# Patient Record
Sex: FEMALE | Race: AMERICAN INDIAN OR ALASKA NATIVE | Employment: UNEMPLOYED | URBAN - METROPOLITAN AREA
[De-identification: names, ages, dates, MRNs, and addresses within clinical notes are randomized per-mention and may not be internally consistent; named-entity substitution may affect disease eponyms.]

---

## 2017-03-02 ENCOUNTER — APPOINTMENT (OUTPATIENT)
Dept: CT IMAGING | Facility: CLINIC | Age: 47
DRG: 918 | End: 2017-03-02
Attending: FAMILY MEDICINE

## 2017-03-02 ENCOUNTER — APPOINTMENT (OUTPATIENT)
Dept: GENERAL RADIOLOGY | Facility: CLINIC | Age: 47
DRG: 918 | End: 2017-03-02
Attending: FAMILY MEDICINE

## 2017-03-02 ENCOUNTER — HOSPITAL ENCOUNTER (INPATIENT)
Facility: CLINIC | Age: 47
LOS: 2 days | Discharge: HOME | DRG: 918 | End: 2017-03-04
Attending: FAMILY MEDICINE | Admitting: FAMILY MEDICINE

## 2017-03-02 DIAGNOSIS — N30.00 ACUTE CYSTITIS WITHOUT HEMATURIA: Primary | ICD-10-CM

## 2017-03-02 DIAGNOSIS — F15.10 METHAMPHETAMINE ABUSE (H): ICD-10-CM

## 2017-03-02 DIAGNOSIS — T40.604A OPIATE OVERDOSE, UNDETERMINED INTENT, INITIAL ENCOUNTER (H): ICD-10-CM

## 2017-03-02 DIAGNOSIS — R40.2432 GLASGOW COMA SCALE TOTAL SCORE 3-8, AT ARRIVAL TO EMERGENCY DEPARTMENT (H): ICD-10-CM

## 2017-03-02 PROBLEM — T50.901A ACCIDENTAL DRUG OVERDOSE: Status: ACTIVE | Noted: 2017-03-02

## 2017-03-02 PROBLEM — R41.89 UNRESPONSIVE: Status: ACTIVE | Noted: 2017-03-02

## 2017-03-02 PROBLEM — T40.601A NARCOTIC OVERDOSE (H): Status: ACTIVE | Noted: 2017-03-02

## 2017-03-02 LAB
ALBUMIN SERPL-MCNC: 3.6 G/DL (ref 3.4–5)
ALBUMIN UR-MCNC: 30 MG/DL
ALP SERPL-CCNC: 110 U/L (ref 40–150)
ALT SERPL W P-5'-P-CCNC: 18 U/L (ref 0–50)
AMPHETAMINES UR QL: ABNORMAL NG/ML
ANION GAP SERPL CALCULATED.3IONS-SCNC: 7 MMOL/L (ref 3–14)
APAP SERPL-MCNC: NORMAL MG/L (ref 10–20)
APPEARANCE UR: ABNORMAL
APTT PPP: 24 SEC (ref 22–37)
AST SERPL W P-5'-P-CCNC: 10 U/L (ref 0–45)
BACTERIA #/AREA URNS HPF: ABNORMAL /HPF
BARBITURATES UR QL SCN: ABNORMAL NG/ML
BASE DEFICIT BLDV-SCNC: 1 MMOL/L
BASE DEFICIT BLDV-SCNC: 1.6 MMOL/L
BASE DEFICIT BLDV-SCNC: 2.2 MMOL/L
BASE DEFICIT BLDV-SCNC: 5.6 MMOL/L
BASOPHILS # BLD AUTO: 0.1 10E9/L (ref 0–0.2)
BASOPHILS NFR BLD AUTO: 0.3 %
BENZODIAZ UR QL SCN: ABNORMAL NG/ML
BETA HCG QUAL IFA URINE: NEGATIVE
BILIRUB SERPL-MCNC: 0.8 MG/DL (ref 0.2–1.3)
BILIRUB UR QL STRIP: NEGATIVE
BUN SERPL-MCNC: 17 MG/DL (ref 7–30)
BUPRENORPHINE UR QL: ABNORMAL NG/ML
CALCIUM SERPL-MCNC: 8.3 MG/DL (ref 8.5–10.1)
CANNABINOIDS UR QL: ABNORMAL NG/ML
CHLORIDE SERPL-SCNC: 108 MMOL/L (ref 94–109)
CO2 SERPL-SCNC: 27 MMOL/L (ref 20–32)
COCAINE UR QL SCN: ABNORMAL NG/ML
COHGB MFR BLD: 3.2 % (ref 0–2)
COLOR UR AUTO: YELLOW
CREAT SERPL-MCNC: 1.2 MG/DL (ref 0.52–1.04)
D-METHAMPHET UR QL: ABNORMAL NG/ML
DIFFERENTIAL METHOD BLD: ABNORMAL
EOSINOPHIL # BLD AUTO: 0.3 10E9/L (ref 0–0.7)
EOSINOPHIL NFR BLD AUTO: 1.7 %
ERYTHROCYTE [DISTWIDTH] IN BLOOD BY AUTOMATED COUNT: 14.4 % (ref 10–15)
ETHANOL SERPL-MCNC: <0.01 G/DL
GFR SERPL CREATININE-BSD FRML MDRD: 48 ML/MIN/1.7M2
GLUCOSE BLDC GLUCOMTR-MCNC: 110 MG/DL (ref 70–99)
GLUCOSE SERPL-MCNC: 243 MG/DL (ref 70–99)
GLUCOSE UR STRIP-MCNC: NEGATIVE MG/DL
GRAN CASTS #/AREA URNS LPF: ABNORMAL /LPF
HCO3 BLDV-SCNC: 24 MMOL/L (ref 21–28)
HCO3 BLDV-SCNC: 25 MMOL/L (ref 21–28)
HCO3 BLDV-SCNC: 26 MMOL/L (ref 21–28)
HCO3 BLDV-SCNC: 26 MMOL/L (ref 21–28)
HCT VFR BLD AUTO: 42 % (ref 35–47)
HGB BLD-MCNC: 13.9 G/DL (ref 11.7–15.7)
HGB UR QL STRIP: ABNORMAL
IMM GRANULOCYTES # BLD: 0 10E9/L (ref 0–0.4)
IMM GRANULOCYTES NFR BLD: 0.2 %
KETONES UR STRIP-MCNC: NEGATIVE MG/DL
LACTATE BLD-SCNC: 0.8 MMOL/L (ref 0.7–2.1)
LACTATE BLD-SCNC: 1.4 MMOL/L (ref 0.7–2.1)
LEUKOCYTE ESTERASE UR QL STRIP: ABNORMAL
LYMPHOCYTES # BLD AUTO: 8.4 10E9/L (ref 0.8–5.3)
LYMPHOCYTES NFR BLD AUTO: 52.2 %
MCH RBC QN AUTO: 28.6 PG (ref 26.5–33)
MCHC RBC AUTO-ENTMCNC: 33.1 G/DL (ref 31.5–36.5)
MCV RBC AUTO: 86 FL (ref 78–100)
METHADONE UR QL SCN: ABNORMAL NG/ML
MONOCYTES # BLD AUTO: 1 10E9/L (ref 0–1.3)
MONOCYTES NFR BLD AUTO: 6.1 %
NEUTROPHILS # BLD AUTO: 6.4 10E9/L (ref 1.6–8.3)
NEUTROPHILS NFR BLD AUTO: 39.5 %
NITRATE UR QL: POSITIVE
NON-SQ EPI CELLS #/AREA URNS LPF: ABNORMAL /LPF
O2/TOTAL GAS SETTING VFR VENT: 21 %
O2/TOTAL GAS SETTING VFR VENT: 40 %
O2/TOTAL GAS SETTING VFR VENT: 40 %
O2/TOTAL GAS SETTING VFR VENT: ABNORMAL %
OPIATES UR QL SCN: ABNORMAL NG/ML
OXYCODONE UR QL SCN: ABNORMAL NG/ML
OXYHGB MFR BLDV: 36 %
PCO2 BLDV: 54 MM HG (ref 40–50)
PCO2 BLDV: 56 MM HG (ref 40–50)
PCO2 BLDV: 59 MM HG (ref 40–50)
PCO2 BLDV: 74 MM HG (ref 40–50)
PCP UR QL SCN: ABNORMAL NG/ML
PH BLDV: 7.12 PH (ref 7.32–7.43)
PH BLDV: 7.24 PH (ref 7.32–7.43)
PH BLDV: 7.27 PH (ref 7.32–7.43)
PH BLDV: 7.29 PH (ref 7.32–7.43)
PH UR STRIP: 6 PH (ref 5–7)
PLATELET # BLD AUTO: 381 10E9/L (ref 150–450)
PO2 BLDV: 25 MM HG (ref 25–47)
PO2 BLDV: 30 MM HG (ref 25–47)
PO2 BLDV: 31 MM HG (ref 25–47)
PO2 BLDV: 69 MM HG (ref 25–47)
POTASSIUM SERPL-SCNC: 3.6 MMOL/L (ref 3.4–5.3)
PROPOXYPH UR QL: ABNORMAL NG/ML
PROT SERPL-MCNC: 8 G/DL (ref 6.8–8.8)
RBC # BLD AUTO: 4.86 10E12/L (ref 3.8–5.2)
RBC #/AREA URNS AUTO: ABNORMAL /HPF (ref 0–2)
SALICYLATES SERPL-MCNC: NORMAL MG/DL
SODIUM SERPL-SCNC: 142 MMOL/L (ref 133–144)
SP GR UR STRIP: >1.03 (ref 1–1.03)
TRANS CELLS #/AREA URNS HPF: ABNORMAL /HPF
TRICYCLICS UR QL SCN: ABNORMAL NG/ML
URN SPEC COLLECT METH UR: ABNORMAL
UROBILINOGEN UR STRIP-ACNC: 0.2 EU/DL (ref 0.2–1)
WBC # BLD AUTO: 16.2 10E9/L (ref 4–11)
WBC #/AREA URNS AUTO: ABNORMAL /HPF (ref 0–2)

## 2017-03-02 PROCEDURE — 70450 CT HEAD/BRAIN W/O DYE: CPT

## 2017-03-02 PROCEDURE — 80329 ANALGESICS NON-OPIOID 1 OR 2: CPT | Performed by: FAMILY MEDICINE

## 2017-03-02 PROCEDURE — 40000940 XR CHEST PORT 1 VW

## 2017-03-02 PROCEDURE — 85730 THROMBOPLASTIN TIME PARTIAL: CPT | Performed by: FAMILY MEDICINE

## 2017-03-02 PROCEDURE — 20000003 ZZH R&B ICU

## 2017-03-02 PROCEDURE — 51702 INSERT TEMP BLADDER CATH: CPT

## 2017-03-02 PROCEDURE — 82805 BLOOD GASES W/O2 SATURATION: CPT | Performed by: FAMILY MEDICINE

## 2017-03-02 PROCEDURE — 87081 CULTURE SCREEN ONLY: CPT | Performed by: FAMILY MEDICINE

## 2017-03-02 PROCEDURE — 00000146 ZZHCL STATISTIC GLUCOSE BY METER IP

## 2017-03-02 PROCEDURE — 83605 ASSAY OF LACTIC ACID: CPT | Performed by: FAMILY MEDICINE

## 2017-03-02 PROCEDURE — 80320 DRUG SCREEN QUANTALCOHOLS: CPT | Performed by: FAMILY MEDICINE

## 2017-03-02 PROCEDURE — 25000128 H RX IP 250 OP 636: Performed by: FAMILY MEDICINE

## 2017-03-02 PROCEDURE — 87088 URINE BACTERIA CULTURE: CPT | Performed by: FAMILY MEDICINE

## 2017-03-02 PROCEDURE — 99291 CRITICAL CARE FIRST HOUR: CPT | Mod: 25

## 2017-03-02 PROCEDURE — 93005 ELECTROCARDIOGRAM TRACING: CPT

## 2017-03-02 PROCEDURE — 99291 CRITICAL CARE FIRST HOUR: CPT | Mod: 25 | Performed by: FAMILY MEDICINE

## 2017-03-02 PROCEDURE — 96375 TX/PRO/DX INJ NEW DRUG ADDON: CPT

## 2017-03-02 PROCEDURE — 36415 COLL VENOUS BLD VENIPUNCTURE: CPT | Performed by: FAMILY MEDICINE

## 2017-03-02 PROCEDURE — 94002 VENT MGMT INPAT INIT DAY: CPT

## 2017-03-02 PROCEDURE — 80053 COMPREHEN METABOLIC PANEL: CPT | Performed by: FAMILY MEDICINE

## 2017-03-02 PROCEDURE — 25000125 ZZHC RX 250: Performed by: FAMILY MEDICINE

## 2017-03-02 PROCEDURE — 99223 1ST HOSP IP/OBS HIGH 75: CPT | Mod: AI | Performed by: FAMILY MEDICINE

## 2017-03-02 PROCEDURE — 31500 INSERT EMERGENCY AIRWAY: CPT

## 2017-03-02 PROCEDURE — 99292 CRITICAL CARE ADDL 30 MIN: CPT

## 2017-03-02 PROCEDURE — 72125 CT NECK SPINE W/O DYE: CPT

## 2017-03-02 PROCEDURE — 82375 ASSAY CARBOXYHB QUANT: CPT | Performed by: FAMILY MEDICINE

## 2017-03-02 PROCEDURE — 40000274 ZZH STATISTIC RCP CONSULT EA 30 MIN

## 2017-03-02 PROCEDURE — 96365 THER/PROPH/DIAG IV INF INIT: CPT

## 2017-03-02 PROCEDURE — 82803 BLOOD GASES ANY COMBINATION: CPT | Performed by: FAMILY MEDICINE

## 2017-03-02 PROCEDURE — 87186 SC STD MICRODIL/AGAR DIL: CPT | Performed by: FAMILY MEDICINE

## 2017-03-02 PROCEDURE — 5A1935Z RESPIRATORY VENTILATION, LESS THAN 24 CONSECUTIVE HOURS: ICD-10-PCS | Performed by: FAMILY MEDICINE

## 2017-03-02 PROCEDURE — 40000270 ZZH STATISTIC OXYGEN  O2DAILY TECH TIME

## 2017-03-02 PROCEDURE — 93010 ELECTROCARDIOGRAM REPORT: CPT | Performed by: FAMILY MEDICINE

## 2017-03-02 PROCEDURE — 31500 INSERT EMERGENCY AIRWAY: CPT | Performed by: FAMILY MEDICINE

## 2017-03-02 PROCEDURE — 96368 THER/DIAG CONCURRENT INF: CPT

## 2017-03-02 PROCEDURE — 99207 ZZC CDG-MDM COMPONENT: MEETS HIGH - UP CODED: CPT | Performed by: FAMILY MEDICINE

## 2017-03-02 PROCEDURE — 85025 COMPLETE CBC W/AUTO DIFF WBC: CPT | Performed by: FAMILY MEDICINE

## 2017-03-02 PROCEDURE — 87086 URINE CULTURE/COLONY COUNT: CPT | Performed by: FAMILY MEDICINE

## 2017-03-02 PROCEDURE — 40000275 ZZH STATISTIC RCP TIME EA 10 MIN

## 2017-03-02 PROCEDURE — 96367 TX/PROPH/DG ADDL SEQ IV INF: CPT

## 2017-03-02 RX ORDER — PROPOFOL 10 MG/ML
100 INJECTION, EMULSION INTRAVENOUS CONTINUOUS
Status: DISCONTINUED | OUTPATIENT
Start: 2017-03-02 | End: 2017-03-02

## 2017-03-02 RX ORDER — DEXTROSE MONOHYDRATE 25 G/50ML
25-50 INJECTION, SOLUTION INTRAVENOUS
Status: DISCONTINUED | OUTPATIENT
Start: 2017-03-02 | End: 2017-03-04 | Stop reason: HOSPADM

## 2017-03-02 RX ORDER — ZOLPIDEM TARTRATE 5 MG/1
TABLET ORAL
COMMUNITY

## 2017-03-02 RX ORDER — NALOXONE HYDROCHLORIDE 0.4 MG/ML
.1-.4 INJECTION, SOLUTION INTRAMUSCULAR; INTRAVENOUS; SUBCUTANEOUS
Status: DISCONTINUED | OUTPATIENT
Start: 2017-03-02 | End: 2017-03-02

## 2017-03-02 RX ORDER — VECURONIUM BROMIDE 1 MG/ML
10 INJECTION, POWDER, LYOPHILIZED, FOR SOLUTION INTRAVENOUS ONCE
Status: DISCONTINUED | OUTPATIENT
Start: 2017-03-02 | End: 2017-03-02

## 2017-03-02 RX ORDER — NICOTINE POLACRILEX 4 MG
15-30 LOZENGE BUCCAL
Status: DISCONTINUED | OUTPATIENT
Start: 2017-03-02 | End: 2017-03-04 | Stop reason: HOSPADM

## 2017-03-02 RX ORDER — VECURONIUM BROMIDE 1 MG/ML
100 INJECTION, POWDER, LYOPHILIZED, FOR SOLUTION INTRAVENOUS ONCE
Status: DISCONTINUED | OUTPATIENT
Start: 2017-03-02 | End: 2017-03-02

## 2017-03-02 RX ORDER — GABAPENTIN 600 MG/1
1200 TABLET ORAL
COMMUNITY

## 2017-03-02 RX ORDER — NALOXONE HYDROCHLORIDE 0.4 MG/ML
0.4 INJECTION, SOLUTION INTRAMUSCULAR; INTRAVENOUS; SUBCUTANEOUS
Status: DISCONTINUED | OUTPATIENT
Start: 2017-03-02 | End: 2017-03-02

## 2017-03-02 RX ORDER — PROPOFOL 10 MG/ML
5-75 INJECTION, EMULSION INTRAVENOUS CONTINUOUS
Status: DISCONTINUED | OUTPATIENT
Start: 2017-03-02 | End: 2017-03-03

## 2017-03-02 RX ORDER — BUPROPION HYDROCHLORIDE 300 MG/1
300 TABLET ORAL
COMMUNITY
Start: 2015-11-03

## 2017-03-02 RX ORDER — LIDOCAINE 40 MG/G
CREAM TOPICAL
Status: DISCONTINUED | OUTPATIENT
Start: 2017-03-02 | End: 2017-03-04 | Stop reason: HOSPADM

## 2017-03-02 RX ORDER — FENTANYL CITRATE 50 UG/ML
50-100 INJECTION, SOLUTION INTRAMUSCULAR; INTRAVENOUS
Status: DISCONTINUED | OUTPATIENT
Start: 2017-03-02 | End: 2017-03-04 | Stop reason: HOSPADM

## 2017-03-02 RX ORDER — DOPAMINE HYDROCHLORIDE 160 MG/100ML
5-20 INJECTION, SOLUTION INTRAVENOUS CONTINUOUS
Status: DISCONTINUED | OUTPATIENT
Start: 2017-03-02 | End: 2017-03-02

## 2017-03-02 RX ORDER — NALOXONE HYDROCHLORIDE 0.4 MG/ML
.1-.4 INJECTION, SOLUTION INTRAMUSCULAR; INTRAVENOUS; SUBCUTANEOUS
Status: DISCONTINUED | OUTPATIENT
Start: 2017-03-02 | End: 2017-03-04 | Stop reason: HOSPADM

## 2017-03-02 RX ORDER — CIPROFLOXACIN 2 MG/ML
200 INJECTION, SOLUTION INTRAVENOUS EVERY 12 HOURS
Status: DISCONTINUED | OUTPATIENT
Start: 2017-03-02 | End: 2017-03-04

## 2017-03-02 RX ORDER — SODIUM CHLORIDE 9 MG/ML
1000 INJECTION, SOLUTION INTRAVENOUS CONTINUOUS
Status: DISCONTINUED | OUTPATIENT
Start: 2017-03-02 | End: 2017-03-03 | Stop reason: DRUGHIGH

## 2017-03-02 RX ORDER — OXYCODONE AND ACETAMINOPHEN 5; 325 MG/1; MG/1
1-2 TABLET ORAL
COMMUNITY
Start: 2015-11-27

## 2017-03-02 RX ORDER — LISINOPRIL 20 MG/1
20 TABLET ORAL
COMMUNITY

## 2017-03-02 RX ADMIN — SODIUM CHLORIDE 1000 ML: 9 INJECTION, SOLUTION INTRAVENOUS at 15:57

## 2017-03-02 RX ADMIN — Medication 100 MG: at 15:56

## 2017-03-02 RX ADMIN — CIPROFLOXACIN 200 MG: 2 INJECTION INTRAVENOUS at 17:36

## 2017-03-02 RX ADMIN — PROPOFOL 5 MCG/KG/MIN: 10 INJECTION, EMULSION INTRAVENOUS at 16:05

## 2017-03-02 RX ADMIN — SODIUM CHLORIDE 1000 ML: 9 INJECTION, SOLUTION INTRAVENOUS at 16:24

## 2017-03-02 RX ADMIN — ENOXAPARIN SODIUM 40 MG: 40 INJECTION SUBCUTANEOUS at 20:04

## 2017-03-02 RX ADMIN — SODIUM CHLORIDE 1000 ML: 9 INJECTION, SOLUTION INTRAVENOUS at 16:02

## 2017-03-02 RX ADMIN — PROPOFOL 100 MG: 10 INJECTION, EMULSION INTRAVENOUS at 15:57

## 2017-03-02 RX ADMIN — Medication 5 MG: at 19:45

## 2017-03-02 RX ADMIN — PROPOFOL 30 MCG/KG/MIN: 10 INJECTION, EMULSION INTRAVENOUS at 20:18

## 2017-03-02 NOTE — IP AVS SNAPSHOT
[facility] ICU    [address]     [place] [address]    Phone:  [phone]                                       After Visit Summary   3/2/2017    Ankita Bateman    MRN: [id number]           After Visit Summary Signature Page     I have received my discharge instructions, and my questions have been answered. I have discussed any challenges I see with this plan with the nurse or doctor.    ..........................................................................................................................................  Patient/Patient Representative Signature      ..........................................................................................................................................  Patient Representative Print Name and Relationship to Patient    ..................................................               ................................................  Date                                            Time    ..........................................................................................................................................  Reviewed by Signature/Title    ...................................................              ..............................................  Date                                                            Time

## 2017-03-02 NOTE — ED PROVIDER NOTES
History   No chief complaint on file.    History provided by: niece. The history is limited by the condition of the patient.     Ankita Bateman is a 46 year old female who presents to the ED via private car.  The patient is completely nonresponsive in the backseat.     I was able to talk to the other 2 people that were in the car.  There were very evasive with their answers about the possibility of heroin overdose.  They told me that Ankita was riding in the car with them on the way to [place].  They stopped for gas in [place] and Ankita was alert and talkative.  Sometime after that Ankita started gurgling, was not able to breathe and then became unresponsive.  They decided to bring her immediately to the emergency department and were relatively close to our facility.  One of them thought that she was unconscious from [place] of [place], so probably 10 - 12 miles of highway travel.       There is no problem list on file for this patient.    No past medical history on file.    History reviewed. No pertinent past surgical history.    No family history on file.    Social History   Substance Use Topics     Smoking status: Not on file     Smokeless tobacco: Not on file     Alcohol use Not on file          There is no immunization history on file for this patient.     No Known Allergies    No current outpatient prescriptions on file.         I have reviewed the Medications, Allergies, Past Medical and Surgical History, and Social History in the Epic system.    Review of Systems   Unable to perform ROS: Patient unresponsive       Physical Exam      See medical chart for vital signs during her time in the emergency department.    Physical Exam  Gen.: 46-year-old male unresponsive.  GCS 3.  She does have a pulse.  She has no respiratory drive.  HEENT: Atraumatic.  Pupils 1-2 mm and nonresponsive.  No injury to external ears.  No injury to dentition.  No vomit obvious.  Nares patent.  Heart: Regular rate and rhythm, no  murmurs  Lungs: No air movement heard bilaterally on arrival.  Abdomen: Soft, bowel sounds present  Extremities: No obvious injury or deformity to extremities.  Skin: Skin is cool to the touch.  Patient has multiple scars and tattoos  : At the time of Torres catheter placement external genitalia appears normal      ED Course  Ankita arrived in the back of a four-door car.  She was slumped over in the back seat unconscious.  She did have a pulse at the time we opened the back door.  The patient had been incontinent of urine.  I did not see evidence of vomit.  A Code Blue was called.  Arvind and I lifted her onto the gurney, trying to be careful to hold her head with some support.  She was completely unresponsive as we rolled into room 5.  We started bag valve mask for oxygenation and hooked up some monitoring.  Upon arrival she had a BP at 157/94, heart rate of 101 and SpO2 at 98%. Her oxygen saturations originally were in the low 80%.  We were able to get IV access and start IV fluids.  We placed a nasal airway and were able to improve oxygen saturations above 90%.  The patient was still unresponsive.  I thought we would be able to do an intubation without medications.   I did use the glide scope to look at her airway and was able to see the cords, but they were clamped shut.  I actually had the ET tube at the cords ready to pass, but they were not opening.  By that time pharmacy had rocuronium and propofol drawn up and we gave her 100 mg of propofol followed by 100 mg of rocuronium.  We had been able to use BVM for oxygen saturations 85-95% during this time.  Obviously the cords were not clamped shut this whole time, but I did not have a view of the cords the entire time.  About 45 seconds after the rocuronium was pushed I was able to visualize the cords and easily pass a 7.5 ET tube.  We secured at 23 cm at the teeth.  Goran Booker CRNA was present as well and passed an OG tube at this time.    Tube was fastened.   End-tidal CO2 was ranging 36-43, we had good chest rise bilaterally and good breath sounds bilaterally.  Torres catheter was placed and there was about 8 cc of urine in her bladder.  Labs have been drawn and are pending.  Urine is being sent to lab at the time of this note.  We have fentanyl available for pain.  The patient has been given one dose of Narcan with no change in her vital signs.  I do have some dopamine ordered if needed for hypotension.  Propofol drip is being used for sedation.  We are going to get a CT scan of her head and neck.  With the ventilator set up with a respiratory rate of 16 as I think the patient is likely to be acidic.     5:16 PM  Chest x-ray for tube placement shows ET tube and OG tube to be in good position.  CT scan of the head to my reading shows no acute bleed and no fracture, and no fluid in the sinuses.  CT scan of the cervical spine also shows no fracture, no pneumothorax and the upper lung fields and no soft tissue swelling.  Formal reports agree with these findings.  The patient could be hospitalized here at [facility] for opiate overdose and methamphetamine abuse.         ED Course     Procedures             EKG Interpretation:      Interpreted by Alexander Mccauley  Time reviewed:  1620  Symptoms at time of EKG: unconscious, likely overdose   Rhythm: sinus tachycardia  Rate: 101  Axis: normal  Ectopy: none  Conduction: normal  ST Segments/ T Waves: No ST-T wave changes  Q Waves: none  Comparison to prior: No old EKG available    Clinical Impression: sinus tachycardia        Critical Care time:  was 45 minutes for this patient excluding procedures.      Results for orders placed or performed during the hospital encounter of 03/02/17 (from the past 24 hour(s))   CBC with platelets differential   Result Value Ref Range    WBC 16.2 (H) 4.0 - 11.0 10e9/L    RBC Count 4.86 3.8 - 5.2 10e12/L    Hemoglobin 13.9 11.7 - 15.7 g/dL    Hematocrit 42.0 35.0 - 47.0 %    MCV 86 78  - 100 fl    MCH 28.6 26.5 - 33.0 pg    MCHC 33.1 31.5 - 36.5 g/dL    RDW 14.4 10.0 - 15.0 %    Platelet Count 381 150 - 450 10e9/L    Diff Method Automated Method     % Neutrophils 39.5 %    % Lymphocytes 52.2 %    % Monocytes 6.1 %    % Eosinophils 1.7 %    % Basophils 0.3 %    % Immature Granulocytes 0.2 %    Absolute Neutrophil 6.4 1.6 - 8.3 10e9/L    Absolute Lymphocytes 8.4 (H) 0.8 - 5.3 10e9/L    Absolute Monocytes 1.0 0.0 - 1.3 10e9/L    Absolute Eosinophils 0.3 0.0 - 0.7 10e9/L    Absolute Basophils 0.1 0.0 - 0.2 10e9/L    Abs Immature Granulocytes 0.0 0 - 0.4 10e9/L   Partial thromboplastin time   Result Value Ref Range    PTT 24 22 - 37 sec   Comprehensive metabolic panel   Result Value Ref Range    Sodium 142 133 - 144 mmol/L    Potassium 3.6 3.4 - 5.3 mmol/L    Chloride 108 94 - 109 mmol/L    Carbon Dioxide 27 20 - 32 mmol/L    Anion Gap 7 3 - 14 mmol/L    Glucose 243 (H) 70 - 99 mg/dL    Urea Nitrogen 17 7 - 30 mg/dL    Creatinine 1.20 (H) 0.52 - 1.04 mg/dL    GFR Estimate 48 (L) >60 mL/min/1.7m2    GFR Estimate If Black 59 (L) >60 mL/min/1.7m2    Calcium 8.3 (L) 8.5 - 10.1 mg/dL    Bilirubin Total 0.8 0.2 - 1.3 mg/dL    Albumin 3.6 3.4 - 5.0 g/dL    Protein Total 8.0 6.8 - 8.8 g/dL    Alkaline Phosphatase 110 40 - 150 U/L    ALT 18 0 - 50 U/L    AST 10 0 - 45 U/L   Salicylate level   Result Value Ref Range    Salicylate Level  mg/dL     <2  Therapeutic:        <20   Anti inflammatory:  15-30     Ethanol level   Result Value Ref Range    Ethanol g/dL <0.01 <0.01 g/dL   Carbon monoxide   Result Value Ref Range    Carbon Monoxide 3.2 (H) 0 - 2 %   Acetaminophen level   Result Value Ref Range    Acetaminophen Level <2  Therapeutic range: 10-20 mg/L   mg/L   Blood gas venous   Result Value Ref Range    Ph Venous 7.12 (LL) 7.32 - 7.43 pH    PCO2 Venous 74 (H) 40 - 50 mm Hg    PO2 Venous 69 (H) 25 - 47 mm Hg    Bicarbonate Venous 24 21 - 28 mmol/L    Base Deficit Venous 5.6 mmol/L    FIO2 21    Lactic acid  whole blood   Result Value Ref Range    Lactic Acid 1.4 0.7 - 2.1 mmol/L   XR Chest Port 1 View    Narrative    CHEST PORTABLE ONE VIEW   3/2/2017 3:51 PM     HISTORY: Evaluate endotracheal tube and orogastric tube placement.    COMPARISON: None.      Impression    IMPRESSION: Heart size and pulmonary vascularity are normal. There are  bibasilar interstitial changes which have the appearance of [place]  B-lines suggesting interstitial edema. The upper lungs appear clear.  Endotracheal tube in good position approximately 2.5 cm above the  lani. Orogastric tube at the esophagogastric junction.     MEERA COHEN MD   UA with Microscopic   Result Value Ref Range    Color Urine Yellow     Appearance Urine Cloudy     Glucose Urine Negative NEG mg/dL    Bilirubin Urine Negative NEG    Ketones Urine Negative NEG mg/dL    Specific Gravity Urine >1.030 1.003 - 1.035    pH Urine 6.0 5.0 - 7.0 pH    Protein Albumin Urine 30 (A) NEG mg/dL    Urobilinogen Urine 0.2 0.2 - 1.0 EU/dL    Nitrite Urine Positive (A) NEG    Blood Urine Trace (A) NEG    Leukocyte Esterase Urine Trace (A) NEG    Source Unspecified Urine     WBC Urine 25-50 (A) 0 - 2 /HPF    RBC Urine O - 2 0 - 2 /HPF    Granular Casts 0-2 (A) NEG /LPF    Squamous Epithelial /LPF Urine Moderate (A) FEW /LPF    Transitional Epi Few FEW /HPF    Bacteria Urine Many (A) NEG /HPF   Beta HCG qual IFA urine   Result Value Ref Range    Beta HCG Qual IFA Urine Negative NEG   Urine Drugs of Abuse Screen Panel 13   Result Value Ref Range    Cannabinoids (49-ozh-5-carboxy-9-THC)  NDET ng/mL     Not Detected   Cutoff for a negative cannabinoid is 50 ng/mL or less.      Phencyclidine (Phencyclidine)  NDET ng/mL     Not Detected   Cutoff for a negative PCP is 25 ng/mL or less.      Cocaine (Benzoylecgonine)  NDET ng/mL     Not Detected   Cutoff for a negative cocaine is 150 ng/ml or less.      Methamphetamine (d-Methamphetamine) (A) NDET ng/mL     Detected, Abnormal Result   Cutoff for  a positive methamphetamine is greater than 500 ng/ml.   This is an unconfirmed screening result to be used for medical purposes only.   Order [id number] for confirmation or individual confirmation tests to [facility].      Opiates (Morphine) (A) NDET ng/mL     Detected, Abnormal Result   Cutoff for a positive opiate is greater than 100 ng/ml.   This is an unconfirmed screening result to be used for medical purposes only.   Order [id number] for confirmation or individual confirmation tests to [facility].      Amphetamine (d-Amphetamine) (A) NDET ng/mL     Detected, Abnormal Result   Cutoff for a positive amphetamine is greater than 500 ng/ml.   This is an unconfirmed screening result to be used for medical purposes only.   Order [id number] for confirmation or individual confirmation tests to [facility].      Benzodiazepines (Nordiazepam)  NDET ng/mL     Not Detected   Cutoff for a negative benzodiazepine is 150 ng/ml or less.      Tricyclic Antidepressants (Desipramine)  NDET ng/mL     Not Detected   Cutoff for a negative tricyclic antidepressant is 300 ng/ml or less.      Methadone (Methadone)  NDET ng/mL     Not Detected   Cutoff for a negative methadone is 200 ng/ml or less.      Barbiturates (Butalbital)  NDET ng/mL     Not Detected   Cutoff for a negative barbituate is 200 ng/ml or less.      Oxycodone (Oxycodone)  NDET ng/mL     Not Detected   Cutoff for a negative Oxycodone is 100 ng/mL or less.      Propoxyphene (Norpropoxyphene)  NDET ng/mL     Not Detected   Cutoff for a negative propoxyphene is 300 ng/ml or less      Buprenorphine (Buprenorphine)  NDET ng/mL     Not Detected   Cutoff for a negative buprenorphine is 10 ng/ml or less     Head CT w/o contrast    Narrative    CT OF THE HEAD WITHOUT CONTRAST 3/2/2017 5:07 PM     COMPARISON: None    HISTORY: Unconscious, overdose.    TECHNIQUE: Axial CT images of the head from the skull base to the  vertex were acquired without IV contrast.    FINDINGS: The ventricles and basal  cisterns are within normal limits  in configuration. There is no midline shift. There are no extra-axial  fluid collections. Gray-white differentiation is well maintained.    No intracranial hemorrhage, mass or recent infarct.    The visualized paranasal sinuses are well-aerated. There is no  mastoiditis. There are no fractures of the visualized bones.      Impression    IMPRESSION: Normal head CT.      Radiation dose for this scan was reduced using automated exposure  control, adjustment of the mA and/or kV according to patient size, or  iterative reconstruction technique   Cervical spine CT w/o contrast    Narrative    CT OF THE CERVICAL SPINE WITHOUT CONTRAST   3/2/2017 5:07 PM     COMPARISON: None.    HISTORY: Unconscious, overdose.     TECHNIQUE: Axial images of the cervical spine were acquired without  intravenous contrast. Multiplanar reformations were created.      FINDINGS: There is normal alignment of the cervical vertebrae.  Vertebral body heights of the cervical spine are normal.  Craniocervical alignment is normal. There are no fractures of the  cervical spine.  There is an accessory ossicle anterior to the dens  and inferior to the anterior arch of C1. There is no significant  spinal canal stenosis of the cervical spine. There is no prevertebral  soft tissue swelling.      Impression    IMPRESSION: No evidence for fracture or traumatic malalignment of the  cervical spine.      Radiation dose for this scan was reduced using automated exposure  control, adjustment of the mA and/or kV according to patient size, or  iterative reconstruction technique       Medications   lidocaine 1 % 1 mL (not administered)   lidocaine (LMX4) kit (not administered)   sodium chloride (PF) 0.9% PF flush 3 mL (not administered)   sodium chloride (PF) 0.9% PF flush 3 mL (3 mLs Intracatheter Not Given 3/2/17 1639)   0.9% sodium chloride BOLUS (0 mLs Intravenous Stopped 3/2/17 1604)     Followed by   0.9% sodium chloride  infusion (1,000 mLs Intravenous New Bag 3/2/17 1624)   propofol (DIPRIVAN) injection 10 mg/mL vial (0 mg Intravenous Stopped 3/2/17 1558)   propofol (DIPRIVAN) infusion (40 mcg/kg/min × 80 kg Intravenous Rate/Dose Change 3/2/17 1631)   fentaNYL Citrate (PF) (SUBLIMAZE) injection  mcg (not administered)   DOPamine 400 mg in dextrose 5% 250 mL (adult std) - premix (not administered)   naloxone (NARCAN) injection 0.4 mg (not administered)   rocuronium (ZEMURON) injection 100 mg (100 mg Intravenous Given During Downtime 3/2/17 1556)   0.9% sodium chloride BOLUS (0 mLs Intravenous Stopped 3/2/17 1604)         Assessments & Plan (with Medical Decision Making)  Ankita was brought to the emergency department by private car.  I did speak with the other people in the car and they reported driving south toward [place].  They stopped and anemia for gas.  I asked them if Ankita likely had access to heroin in all anemia and a sed rate was possible.  I asked them if she uses heroin and they said that was also possible.  I asked if she used heroin in the car and they said that was possible.  When she arrived in the emergency department she was unconscious in the back seat and incontinent of urine.  I did not see evidence of vomit.  She was brought into room 5 and intubated shortly after arrival.  Torres catheter is placed.  Labs were drawn at that time.  EKG was done and shows sinus tachycardia.  CT scan of the head and neck was done and shows no fractures and no intracranial bleed.  No evidence of pneumothorax in the apices of the lung fields on CT scan.   I did speak to Dr. Rodarte about this patient and she will be a full admission to the ICU for opiate overdose, methamphetamine abuse and coma.       I have reviewed the nursing notes.    I have reviewed the findings, diagnosis, plan and need for follow up with the patient.    New Prescriptions    No medications on file       Final diagnoses:   Methamphetamine abuse   [place]  coma scale total score 3-8, at arrival to emergency department   Opiate overdose, undetermined intent, initial encounter       This document serves as a record of services personally performed by Alexander Mccauley MD. It was created on their behalf by Ally Melton, a trained medical scribe. The creation of this record is based on the provider's personal observations and the statements of the patient. This document has been checked and approved by the attending provider.   Note: Chart documentation done in part with Dragon Voice Recognition software. Although reviewed after completion, some word and grammatical errors may remain.        3/2/2017   [facility] EMERGENCY DEPARTMENT     Alexander Mccauley MD  03/02/17 [id number]

## 2017-03-02 NOTE — IP AVS SNAPSHOT
MRN:[id number]                      After Visit Summary   3/2/2017    Ankita Bateman    MRN: [id number]           Thank you!     Thank you for choosing [facility] for your care. Our goal is always to provide you with excellent care. Hearing back from our patients is one way we can continue to improve our services. Please take a few minutes to complete the written survey that you may receive in the mail after you visit with us. Thank you!        Patient Information     Date Of Birth          1970        About your hospital stay     You were admitted on:  March 2, 2017 You last received care in the:  [facility] ICU    You were discharged on:  March 4, 2017        Reason for your hospital stay       1.  Respiratory failure because of heroin and meth reaction.  You have recovered well and the tube that was put down your throat to save your life and help you breath has been removed without difficulty as you recovered.  Please continue to try to find treatment options to help going forward.  2.  Urinary tract infection - you will be going home with the antibiotic Cipro.  Please call Sylvia, the charge nurse at [facility] tomorrow at [phone] to go over the results of your urine culture to make sure this is the best antibiotic option for treating this infection going forward.                  Who to Call     For medical emergencies, please call 911.  For non-urgent questions about your medical care, please call your primary care provider or clinic, None          Attending Provider     Provider Alexander Colon MD Family Practice    Greg Rodarte MD Family Practice       Primary Care Provider    None       No address on file        After Care Instructions     Activity       Your activity upon discharge: activity as tolerated            Diet       Follow this diet upon discharge: Moderate consistent carbohydrate (4285-2923 manny / 4-6 CHO units per meal)    "               Follow-up Appointments     Follow-up and recommended labs and tests        Call Sylvia charge nurse, as above for final urine culture results tomorrow.  Follow up with primary care provider, None, within 7 days for hospital follow- up.                  Your next 10 appointments already scheduled     Mar 10, 2017  1:00 PM CST   [facility] Garcia DO   [facility]    [address] [facility] [address] [phone]           Bring a current list of meds and any records pertaining to this visit.  For Physicals, please bring immunization records and any forms needing to be filled out.  Please arrive 10 minutes early to complete paperwork.              Further instructions from your care team       2 bracelets and 1 ring in patients bin, please return upon discharge    Pending Results     Date and Time Order Name Status Description    3/3/2017 1212 Urine Culture Aerobic Bacterial Preliminary             Statement of Approval     Ordered          03/04/17 1700  I have reviewed and agree with all the recommendations and orders detailed in this document.  EFFECTIVE NOW     Approved and electronically signed by:  Anna Garcia MD             Admission Information     Date & Time Provider Department Dept. Phone    3/2/2017 Greg Rodarte MD [facility] [phone]      Your Vitals Were     Blood Pressure Pulse Temperature Respirations Weight Pulse Oximetry    108/70 (BP Location: Left arm) 80 97.9  F (36.6  C) (Oral) 16 76.5 kg (168 lb 10.4 oz) 98%      MyChart Information     [employer] lets you send messages to your doctor, view your test results, renew your prescriptions, schedule appointments and more. To sign up, go to [email or web address] . Click on \"Log in\" on the left side of the screen, which will take you to the Welcome page. Then click on \"Sign up Now\" on the right side of the page.     You will " be asked to enter the access code listed below, as well as some personal information. Please follow the directions to create your username and password.     Your access code is: [email or web address]  Expires: 2017  5:19 PM     Your access code will  in 90 days. If you need help or a new code, please call your [facility] clinic or [phone].        Care EveryWhere ID     This is your Care EveryWhere ID. This could be used by other organizations to access your [facility] medical records  [id number]           Review of your medicines      START taking        Dose / Directions    ciprofloxacin 250 MG tablet   Commonly known as:  CIPRO   Indication:  Urinary Tract Infection        Dose:  250 mg   Start taking on:  3/5/2017   Take 1 tablet (250 mg) by mouth 2 times daily for 5 days   Quantity:  10 tablet   Refills:  0         CONTINUE these medicines which have NOT CHANGED        Dose / Directions    buPROPion 300 MG 24 hr tablet   Commonly known as:  WELLBUTRIN XL        Dose:  300 mg   Take 300 mg by mouth   Refills:  0       gabapentin 600 MG tablet   Commonly known as:  NEURONTIN        Dose:  1200 mg   Take 1,200 mg by mouth   Refills:  0       lisinopril 20 MG tablet   Commonly known as:  PRINIVIL/ZESTRIL        Dose:  20 mg   Take 20 mg by mouth   Refills:  0       metFORMIN 1000 MG tablet   Commonly known as:  GLUCOPHAGE        Dose:  1000 mg   Take 1,000 mg by mouth   Refills:  0       oxyCODONE-acetaminophen 5-325 MG per tablet   Commonly known as:  PERCOCET        Dose:  1-2 tablet   Take 1-2 tablets by mouth   Refills:  0       zolpidem 5 MG tablet   Commonly known as:  AMBIEN        Refills:  0            Where to get your medicines      Some of these will need a paper prescription and others can be bought over the counter. Ask your nurse if you have questions.     Bring a paper prescription for each of these medications     ciprofloxacin 250 MG tablet                Protect others around you: Learn how to  safely use, store and throw away your medicines at www.disposemymeds.org.             Medication List: This is a list of all your medications and when to take them. Check marks below indicate your daily home schedule. Keep this list as a reference.      Medications           Morning Afternoon Evening Bedtime As Needed    buPROPion 300 MG 24 hr tablet   Commonly known as:  WELLBUTRIN XL   Take 300 mg by mouth                                ciprofloxacin 250 MG tablet   Commonly known as:  CIPRO   Take 1 tablet (250 mg) by mouth 2 times daily for 5 days   Start taking on:  3/5/2017   Last time this was given:  250 mg on 3/4/2017  5:14 PM                                gabapentin 600 MG tablet   Commonly known as:  NEURONTIN   Take 1,200 mg by mouth                                lisinopril 20 MG tablet   Commonly known as:  PRINIVIL/ZESTRIL   Take 20 mg by mouth                                metFORMIN 1000 MG tablet   Commonly known as:  GLUCOPHAGE   Take 1,000 mg by mouth                                oxyCODONE-acetaminophen 5-325 MG per tablet   Commonly known as:  PERCOCET   Take 1-2 tablets by mouth                                zolpidem 5 MG tablet   Commonly known as:  AMBIEN

## 2017-03-03 PROBLEM — N30.00 ACUTE CYSTITIS WITHOUT HEMATURIA: Status: ACTIVE | Noted: 2017-03-03

## 2017-03-03 PROBLEM — E11.9 TYPE 2 DIABETES MELLITUS WITHOUT COMPLICATION (H): Status: ACTIVE | Noted: 2017-03-03

## 2017-03-03 LAB
ANION GAP SERPL CALCULATED.3IONS-SCNC: 6 MMOL/L (ref 3–14)
BASE DEFICIT BLDV-SCNC: 2.1 MMOL/L
BASE DEFICIT BLDV-SCNC: 2.5 MMOL/L
BUN SERPL-MCNC: 13 MG/DL (ref 7–30)
CALCIUM SERPL-MCNC: 7.6 MG/DL (ref 8.5–10.1)
CHLORIDE SERPL-SCNC: 115 MMOL/L (ref 94–109)
CO2 SERPL-SCNC: 24 MMOL/L (ref 20–32)
CREAT SERPL-MCNC: 1.1 MG/DL (ref 0.52–1.04)
ERYTHROCYTE [DISTWIDTH] IN BLOOD BY AUTOMATED COUNT: 14.5 % (ref 10–15)
GFR SERPL CREATININE-BSD FRML MDRD: 53 ML/MIN/1.7M2
GLUCOSE BLDC GLUCOMTR-MCNC: 108 MG/DL (ref 70–99)
GLUCOSE BLDC GLUCOMTR-MCNC: 108 MG/DL (ref 70–99)
GLUCOSE BLDC GLUCOMTR-MCNC: 110 MG/DL (ref 70–99)
GLUCOSE BLDC GLUCOMTR-MCNC: 111 MG/DL (ref 70–99)
GLUCOSE BLDC GLUCOMTR-MCNC: 88 MG/DL (ref 70–99)
GLUCOSE BLDC GLUCOMTR-MCNC: 97 MG/DL (ref 70–99)
GLUCOSE BLDC GLUCOMTR-MCNC: 98 MG/DL (ref 70–99)
GLUCOSE SERPL-MCNC: 92 MG/DL (ref 70–99)
HBA1C MFR BLD: 7.5 % (ref 4.3–6)
HCO3 BLDV-SCNC: 24 MMOL/L (ref 21–28)
HCO3 BLDV-SCNC: 24 MMOL/L (ref 21–28)
HCT VFR BLD AUTO: 35 % (ref 35–47)
HGB BLD-MCNC: 11.2 G/DL (ref 11.7–15.7)
LACTATE BLD-SCNC: 1.1 MMOL/L (ref 0.7–2.1)
MCH RBC QN AUTO: 28.2 PG (ref 26.5–33)
MCHC RBC AUTO-ENTMCNC: 32 G/DL (ref 31.5–36.5)
MCV RBC AUTO: 88 FL (ref 78–100)
O2/TOTAL GAS SETTING VFR VENT: ABNORMAL %
O2/TOTAL GAS SETTING VFR VENT: ABNORMAL %
PCO2 BLDV: 47 MM HG (ref 40–50)
PCO2 BLDV: 53 MM HG (ref 40–50)
PH BLDV: 7.27 PH (ref 7.32–7.43)
PH BLDV: 7.32 PH (ref 7.32–7.43)
PLATELET # BLD AUTO: 257 10E9/L (ref 150–450)
PO2 BLDV: 22 MM HG (ref 25–47)
PO2 BLDV: 31 MM HG (ref 25–47)
POTASSIUM SERPL-SCNC: 3.9 MMOL/L (ref 3.4–5.3)
RBC # BLD AUTO: 3.97 10E12/L (ref 3.8–5.2)
SODIUM SERPL-SCNC: 145 MMOL/L (ref 133–144)
WBC # BLD AUTO: 11.5 10E9/L (ref 4–11)

## 2017-03-03 PROCEDURE — 00000146 ZZHCL STATISTIC GLUCOSE BY METER IP

## 2017-03-03 PROCEDURE — 82803 BLOOD GASES ANY COMBINATION: CPT | Performed by: FAMILY MEDICINE

## 2017-03-03 PROCEDURE — 40000275 ZZH STATISTIC RCP TIME EA 10 MIN

## 2017-03-03 PROCEDURE — 83036 HEMOGLOBIN GLYCOSYLATED A1C: CPT | Performed by: FAMILY MEDICINE

## 2017-03-03 PROCEDURE — 25000128 H RX IP 250 OP 636

## 2017-03-03 PROCEDURE — 40000270 ZZH STATISTIC OXYGEN  O2DAILY TECH TIME

## 2017-03-03 PROCEDURE — 36415 COLL VENOUS BLD VENIPUNCTURE: CPT | Performed by: FAMILY MEDICINE

## 2017-03-03 PROCEDURE — 94003 VENT MGMT INPAT SUBQ DAY: CPT

## 2017-03-03 PROCEDURE — 80048 BASIC METABOLIC PNL TOTAL CA: CPT | Performed by: FAMILY MEDICINE

## 2017-03-03 PROCEDURE — 85027 COMPLETE CBC AUTOMATED: CPT | Performed by: FAMILY MEDICINE

## 2017-03-03 PROCEDURE — 25000125 ZZHC RX 250: Performed by: FAMILY MEDICINE

## 2017-03-03 PROCEDURE — 25000128 H RX IP 250 OP 636: Performed by: FAMILY MEDICINE

## 2017-03-03 PROCEDURE — 83605 ASSAY OF LACTIC ACID: CPT | Performed by: FAMILY MEDICINE

## 2017-03-03 PROCEDURE — 20000003 ZZH R&B ICU

## 2017-03-03 PROCEDURE — 25000132 ZZH RX MED GY IP 250 OP 250 PS 637: Performed by: FAMILY MEDICINE

## 2017-03-03 PROCEDURE — 99232 SBSQ HOSP IP/OBS MODERATE 35: CPT | Performed by: FAMILY MEDICINE

## 2017-03-03 RX ORDER — ONDANSETRON 2 MG/ML
INJECTION INTRAMUSCULAR; INTRAVENOUS
Status: COMPLETED
Start: 2017-03-03 | End: 2017-03-03

## 2017-03-03 RX ORDER — METOCLOPRAMIDE HYDROCHLORIDE 5 MG/ML
10 INJECTION INTRAMUSCULAR; INTRAVENOUS EVERY 6 HOURS PRN
Status: DISCONTINUED | OUTPATIENT
Start: 2017-03-03 | End: 2017-03-04 | Stop reason: HOSPADM

## 2017-03-03 RX ORDER — PROCHLORPERAZINE 25 MG
25 SUPPOSITORY, RECTAL RECTAL EVERY 12 HOURS PRN
Status: DISCONTINUED | OUTPATIENT
Start: 2017-03-03 | End: 2017-03-04 | Stop reason: HOSPADM

## 2017-03-03 RX ORDER — PROCHLORPERAZINE MALEATE 5 MG
5-10 TABLET ORAL EVERY 6 HOURS PRN
Status: DISCONTINUED | OUTPATIENT
Start: 2017-03-03 | End: 2017-03-04 | Stop reason: HOSPADM

## 2017-03-03 RX ORDER — ONDANSETRON 4 MG/1
4 TABLET, ORALLY DISINTEGRATING ORAL EVERY 6 HOURS PRN
Status: DISCONTINUED | OUTPATIENT
Start: 2017-03-03 | End: 2017-03-04 | Stop reason: HOSPADM

## 2017-03-03 RX ORDER — ACETAMINOPHEN 325 MG/1
650 TABLET ORAL EVERY 4 HOURS PRN
Status: DISCONTINUED | OUTPATIENT
Start: 2017-03-03 | End: 2017-03-04 | Stop reason: HOSPADM

## 2017-03-03 RX ORDER — ONDANSETRON 2 MG/ML
4 INJECTION INTRAMUSCULAR; INTRAVENOUS EVERY 6 HOURS PRN
Status: DISCONTINUED | OUTPATIENT
Start: 2017-03-03 | End: 2017-03-04 | Stop reason: HOSPADM

## 2017-03-03 RX ORDER — METOCLOPRAMIDE 5 MG/1
10 TABLET ORAL EVERY 6 HOURS PRN
Status: DISCONTINUED | OUTPATIENT
Start: 2017-03-03 | End: 2017-03-04 | Stop reason: HOSPADM

## 2017-03-03 RX ADMIN — CIPROFLOXACIN 200 MG: 2 INJECTION INTRAVENOUS at 05:20

## 2017-03-03 RX ADMIN — ONDANSETRON 4 MG: 2 INJECTION INTRAMUSCULAR; INTRAVENOUS at 00:52

## 2017-03-03 RX ADMIN — PROPOFOL 15 MCG/KG/MIN: 10 INJECTION, EMULSION INTRAVENOUS at 08:12

## 2017-03-03 RX ADMIN — SODIUM CHLORIDE 1000 ML: 9 INJECTION, SOLUTION INTRAVENOUS at 04:36

## 2017-03-03 RX ADMIN — PROPOFOL 20 MCG/KG/MIN: 10 INJECTION, EMULSION INTRAVENOUS at 08:02

## 2017-03-03 RX ADMIN — DEXTROSE AND SODIUM CHLORIDE: 5; 900 INJECTION, SOLUTION INTRAVENOUS at 00:53

## 2017-03-03 RX ADMIN — PROPOFOL 35 MCG/KG/MIN: 10 INJECTION, EMULSION INTRAVENOUS at 02:41

## 2017-03-03 RX ADMIN — PROPOFOL 10 MCG/KG/MIN: 10 INJECTION, EMULSION INTRAVENOUS at 08:26

## 2017-03-03 RX ADMIN — PROPOFOL 5 MCG/KG/MIN: 10 INJECTION, EMULSION INTRAVENOUS at 08:44

## 2017-03-03 RX ADMIN — ACETAMINOPHEN 650 MG: 325 TABLET ORAL at 21:22

## 2017-03-03 RX ADMIN — CIPROFLOXACIN 200 MG: 2 INJECTION INTRAVENOUS at 17:12

## 2017-03-03 RX ADMIN — SODIUM CHLORIDE 1000 ML: 9 INJECTION, SOLUTION INTRAVENOUS at 07:48

## 2017-03-03 RX ADMIN — ENOXAPARIN SODIUM 40 MG: 40 INJECTION SUBCUTANEOUS at 19:55

## 2017-03-03 RX ADMIN — ONDANSETRON HYDROCHLORIDE 4 MG: 2 SOLUTION INTRAMUSCULAR; INTRAVENOUS at 00:52

## 2017-03-03 NOTE — ED NOTES
ED Nursing criteria listed below was addressed during verbal handoff:     Abnormal vitals: Yes  Abnormal results: Yes  Med Reconciliation completed: Yes  Meds given in ED: Yes  Any Overdue Meds: No  Core Measures: N/A  Isolation: N/A  Special needs: Yes  Skin assessment: Yes    Observation Patient  Education provided: N/A

## 2017-03-03 NOTE — PROGRESS NOTES
Right wrist and Left wrist restraints discontinued at 1120 PM on 3/3/2017.    Restraint discontinue criteria met, patient is calm, cooperative and safe. Restraints removed.     Patient's Response: Demonstrates understanding  Family Notification: Other  Attending Physician Notified: Yes, Attending Physician's Name: Dr. Jose Collier

## 2017-03-03 NOTE — PROGRESS NOTES
Patient has been weaned off propofol and on CPAP trial with stable respiratory status.  She is awake, following directions with VBG normalizing and patient able to take deep breath with tidal volumes over 800 on command.  Discussed with Dr. Dowling, tele-ICU physician on call, who agrees patient is ready for extubation.  Will proceed with extubation.    Electronically Signed:  Anna Garcia MD

## 2017-03-03 NOTE — PROGRESS NOTES
"S-(situation): Patient arrives to room 218 via cart from ED.    B-(background): unresponsive/drug OD    A-(assessment): Pt intubated.  VSS.  Sedated/on propofol.  Lungs coarse.  No returns on in-line suction.  OG in place.  Torres patent.  Niece arrived.  Denied any drug use.  \"just started hanging out with my aunt\".  Pt lives with SO, Jose J, who is not present.  Has dry cracked heels otherwise no skin issues.  Niece did know that she was a diabetic and has freq \"lung\" infections.    R-(recommendations): Orders reviewed with unable to review. Will monitor patient per MD orders.     Inpatient nursing criteria listed below were met:    Health care directives status obtained and documented: NI  Core Measures assessed (SSI): Yes  SCD's Documented: on lovenox  Vaccine assessment done and vaccines ordered if appropriate: NI  Skin issues/needs documented:NA  Isolation needs addressed, if appropriate: NA  Fall Prevention: Care plan updated, Education given and documented Yes  MRSA swab completed for patient 55 years and older (exclude PILAR and TKA): obtained per MD order.  My Chart patient sign up addressed and documented: NI  Care Plan initiated: Yes  Education Assessment documented:NI  Education Documented (Pre-existing chronic infection such as, MRSA/VRE need education on admission): No  New medication patient education completed and documented (Possible Side Effects of Common Medications handout): No  Home medications if not able to send immediately home with family stored here: NA   Reminder note placed in discharge instructions: NA  Discharge planning review completed (admission navigator) Yes        "

## 2017-03-03 NOTE — PROGRESS NOTES
[facility] Progress Note          Assessment and Plan:   Assessment:   Active Problems:    Accidental drug overdose; Unresponsive episode    Assessment: Patient is now extubated within the past hour and mentally alert.  Does admit to meth use yesterday morning and heroin use at about 1400 yesterday afternoon.  Has no thoughts of killing herself and states she does not want to die.      Plan: continue to monitor patient closely following extubation and watch for symptoms of withdrawal.  Will start with sips of clears and advance diet as tolerated.  Continue discussion about drug use with patient and will provide options for assistance if patient desires - currently is not interested in discussing this further      Narcotic overdose    Assessment: heroin, improving as above    Plan: continue plan as above      Methamphetamine abuse    Assessment: ongoing, no signs of withdrawal at this time    Plan: continue plan as above      Acute cystitis without hematuria    Assessment: UA is highly suggestive of UTI, on cipro with culture pending    Plan: continue with Cipro and monitor for UC results.       Diabetes mellitus type 2 without complication    Assessment:  Blood sugars were low during time of intubation and patient on dextrose containing fluids with blood sugars stabilizing.      Plan:  Will continue to monitor blood sugars every 4 hours and continue dextrose containing fluids until patient is tolerating oral intake well, then transition to QID monitoring with SSI and discontinue IV fluids.  Would restart metformin tomorrow morning if blood sugars stable - of note, patient recently had dose decreased to 500 mg daily by PCP.        VTE:  Lovenox   Code Status:  Full code        Interval History:   Continues to improve.  Vital signs generally better with blood pressure stable off propofol and urinary output increasing.  Extubated without difficulty and patient is currently on RA.  Tolerating medications without  significant side effects.  Blood sugars have stabilized with dextrose containing fluids.  Patient does admit to using heroin yesterday afternoon around 1400 and meth last yesterday morning.            Significant Problems:     Past Medical History   Diagnosis Date     Diabetes (H)      Hypertension      Substance abuse             Physical Exam:   Blood pressure 103/62, temperature 97.8  F (36.6  C), temperature source Oral, resp. rate 12, weight 84 kg (185 lb 3 oz), SpO2 100 %.  Constitutional:   awake, alert, cooperative, no apparent distress, and appears stated age     Lungs:   No increased work of breathing, slightly decreased air exchange but improved with repeat deep breathing, mild crackles diffusely which again clear with repeat breath sounds     Cardiovascular:   Normal apical impulse, regular rate and rhythm, normal S1 and S2, no S3 or S4, and no murmur noted     Abdomen:   Bowel sounds present, abdomen soft and non-tender     Musculoskeletal:   no lower extremity pitting edema present     Neurologic:   Awake, alert, oriented to name, place and time.       Skin:   normal skin color, texture, turgor             Data:   All laboratory data reviewed    Attestation:  I have reviewed today's vital signs, notes, medications, labs and imaging.     Electronically Signed:  Anna Garcia MD    Note: Chart documentation done in part with Dragon Voice Recognition software. Although reviewed after completion, some word and grammatical errors may remain.

## 2017-03-03 NOTE — PROVIDER NOTIFICATION
S: emesis  B: intubated  A: Pt had large emesis in mouth and via og tube.  OG migrated out 10 cm and able to suction mouth/  Pt awake shook head appropriate. Had 150cc yellowish/orange output via OG.  OG advanced.  md notified.  zofran ordered.  sats gtt'd, increased fio2 100% sats 93%, lung sounds coarse with exp whz.    R:  Will cont to monitor temp and resp status.

## 2017-03-03 NOTE — PLAN OF CARE
Problem: Goal Outcome Summary  Goal: Goal Outcome Summary  Outcome: Improving  Pt had low bp and low uo.  1000cc bolus given with improvement.   Pt shakes head yes/no to questions.  Lung sounds very coarse.  Has had no more emesis since earlier, although OG output is 400cc.   Ji thin secretions via in line suction.   Was at 100%fio2 after emesis now weaned down to 40% to maintain sats >92%.  Afebrile.  Has menses.  Propofol at 35mcg/kg.  Blood glucose low.  IVf's changed.  On cipro for UTI.  Propofol infusing in left arm, rt wrist has a saline lock.

## 2017-03-03 NOTE — PLAN OF CARE
Problem: Goal Outcome Summary  Goal: Goal Outcome Summary  Outcome: Improving  S. End of shift report     B. Intubated/accidental drug overdose.      A. Patient extubated @ 1120 with OG Dc'd, BG WNL tolerating reg CCD. Sats drop to 89% when patient is sleeping. 02 applied @ 1L sats increased to 93%. I/S encouraged. UOP 1100cc's this 12 hour shift, Lungs clear with crackles in the bases. BS+ Tolerating ice chips patient is AOX4.       R. Will continue to monitor sats, RR, labs.  per POC.

## 2017-03-03 NOTE — H&P
[facility]    History and Physical  Hospitalist       Date of Admission:  3/2/2017  Date of Service (when I saw the patient): 03/02/17    Assessment & Plan   Ankita Bateman is a 46 year old female who presents with unresponsiveness. Within the back of a car, traveling with friends which became unresponsive. She's brought emergency department by private car and on arrival was unresponsive, incontinence. There is a good chance based on the ER record that she had been abusing narcotics, possibly heroine and has history of methamphetamine abuse. On arrival to the emergency department she was unresponsive and she was immediately intubated after paralysis with vecuronium. She is now on mechanical ventilation is being admitted to the ICU for treatment for possible heroin overdose and methamphetamine abuse. She was slightly hypotensive downstairs, but has improved with IV fluids. Her vital signs are otherwise stable. She will be admitted to the ICU, maintain an mechanical ventilator with propofol infusion for sedation. The plan would be to keep run the ventilator overnight and then tomorrow morning wean off of medications and see if she can be extubated. With previous history of diabetes, will check sugars every 4 hours with sliding scale coverage.    Active Problems:    Accidental drug overdose    Narcotic overdose    Methamphetamine abuse    Unresponsive    DVT Prophylaxis: Enoxaprain (Lovenox) SQ  Code Status: Full Code    Disposition: Expected discharge in 2 days once fully awake, able to eat drink.    Greg Rodarte MD    Primary Care Physician   No primary care provider on file.    Chief Complaint   brought to the emergency department unresponsive    History is obtained from the electronic health record and emergency department physician    History of Present Illness   Ankita Bateman is a 46 year old female who presents with unresponsiveness. His traveling with friends towards the [facility].  She was last awake and talking in [place]. At that time her friends noted that she slumped over became unresponsive. She's brought by private car to the emergency department. On arrival, she was unresponsive, incontinent of urine and was immediately brought into the emergency department and intubated. She was given Narcan with some some improvement in vital signs. She was initially mildly hypotensive, but this responded to IV fluids. She is now admitted to the ICU, intubated with mechanical ventilation and is on a propofol infusion. She is unable to give any medical history. Her chart was reviewed in care everywhere with no diagnosis of diabetes, hypertension. Previously was an metformin and lisinopril. She is a smoker, unclear how much currently.  Past Medical History    I have reviewed this patient's medical history and updated it with pertinent information if needed.   Past Medical History   Diagnosis Date     Diabetes (H)      Hypertension      Substance abuse        Past Surgical History   I have reviewed this patient's surgical history and updated it with pertinent information if needed.  Past Surgical History   Procedure Laterality Date      section       Breast surgery         Prior to Admission Medications   Prior to Admission Medications   Prescriptions Last Dose Informant Patient Reported? Taking?   buPROPion (WELLBUTRIN XL) 300 MG 24 hr tablet   Yes Yes   Sig: Take 300 mg by mouth   gabapentin (NEURONTIN) 600 MG tablet   Yes No   Sig: Take 1,200 mg by mouth   lisinopril (PRINIVIL/ZESTRIL) 20 MG tablet   Yes No   Sig: Take 20 mg by mouth   metFORMIN (GLUCOPHAGE) 1000 MG tablet   Yes Yes   Sig: Take 1,000 mg by mouth   oxyCODONE-acetaminophen (PERCOCET) 5-325 MG per tablet   Yes Yes   Sig: Take 1-2 tablets by mouth   zolpidem (AMBIEN) 5 MG tablet   Yes No      Facility-Administered Medications: None     Allergies   No Known Allergies    Social History   I have reviewed this patient's social history  and updated it with pertinent information if needed. Ankita Bateman  reports that she has been smoking.  She does not have any smokeless tobacco history on file. She reports that she uses illicit drugs, including Methamphetamines.    Family History   I have reviewed this patient's family history and updated it with pertinent information if needed.   Family History   Problem Relation Age of Onset     Family history unknown: Yes       Review of Systems   Review of systems not obtained due to patient factors - intubation    Physical Exam       BP: 113/80   Heart Rate: 82 Resp: 12 SpO2: 96 % O2 Device: Mechanical Ventilator    Vital Signs with Ranges  Heart Rate:  [] 82  Resp:  [7-19] 12  BP: ()/() 113/80  FiO2 (%):  [40 %] 40 %  SpO2:  [94 %-100 %] 96 %  176 lbs 5.89 oz    EXAM:  Constitutional: intubated, sedated   Cardiovascular: PMI normal. No lifts, heaves, or thrills. RRR. No murmurs, clicks gallops or rub  Respiratory: ventilated, equal breath sounds  Head: no signs of trauma  Neck: no signs of trauma, no signs of lymph node enlargement  Abdomen: soft, no obvious masses  NEURO: unable to test, no obvious track marks  SKIN: no suspicious lesions or rashes  LYMPH: Normal cervical lymph nodes  JOINT/EXTREMITIES: extremities normal- no gross deformities noted     Data   Data reviewed today:  I personally reviewed the head CT image(s) showing Shows no sign of intracranial bleed and the CT of neck without abnormalities, EKG shows sinus tachycardia, no acute changes image(s) showing no changes.    Recent Labs  Lab 03/02/17  1538   WBC 16.2*   HGB 13.9   MCV 86         POTASSIUM 3.6   CHLORIDE 108   CO2 27   BUN 17   CR 1.20*   ANIONGAP 7   ISIAH 8.3*   *   ALBUMIN 3.6   PROTTOTAL 8.0   BILITOTAL 0.8   ALKPHOS 110   ALT 18   AST 10       Recent Results (from the past 24 hour(s))   XR Chest Port 1 View    Narrative    CHEST PORTABLE ONE VIEW   3/2/2017 3:51 PM     HISTORY: Evaluate  endotracheal tube and orogastric tube placement.    COMPARISON: None.      Impression    IMPRESSION: Heart size and pulmonary vascularity are normal. There are  bibasilar interstitial changes which have the appearance of Kerley  B-lines suggesting interstitial edema. The upper lungs appear clear.  Endotracheal tube in good position approximately 2.5 cm above the  lani. Orogastric tube at the esophagogastric junction.     MEERA COHEN MD   Head CT w/o contrast    Narrative    CT OF THE HEAD WITHOUT CONTRAST 3/2/2017 5:07 PM     COMPARISON: None    HISTORY: Unconscious, overdose.    TECHNIQUE: Axial CT images of the head from the skull base to the  vertex were acquired without IV contrast.    FINDINGS: The ventricles and basal cisterns are within normal limits  in configuration. There is no midline shift. There are no extra-axial  fluid collections. Gray-white differentiation is well maintained.    No intracranial hemorrhage, mass or recent infarct.    The visualized paranasal sinuses are well-aerated. There is no  mastoiditis. There are no fractures of the visualized bones.      Impression    IMPRESSION: Normal head CT.      Radiation dose for this scan was reduced using automated exposure  control, adjustment of the mA and/or kV according to patient size, or  iterative reconstruction technique   Cervical spine CT w/o contrast    Narrative    CT OF THE CERVICAL SPINE WITHOUT CONTRAST   3/2/2017 5:07 PM     COMPARISON: None.    HISTORY: Unconscious, overdose.     TECHNIQUE: Axial images of the cervical spine were acquired without  intravenous contrast. Multiplanar reformations were created.      FINDINGS: There is normal alignment of the cervical vertebrae.  Vertebral body heights of the cervical spine are normal.  Craniocervical alignment is normal. There are no fractures of the  cervical spine.  There is an accessory ossicle anterior to the dens  and inferior to the anterior arch of C1. There is no  significant  spinal canal stenosis of the cervical spine. There is no prevertebral  soft tissue swelling.      Impression    IMPRESSION: No evidence for fracture or traumatic malalignment of the  cervical spine.      Radiation dose for this scan was reduced using automated exposure  control, adjustment of the mA and/or kV according to patient size, or  iterative reconstruction technique

## 2017-03-03 NOTE — PROGRESS NOTES
Pt extubated to 2 lpm Nc w/o complications. SpO2 97%  BS good and no stridor noted. RT to follow as needed.

## 2017-03-03 NOTE — PROGRESS NOTES
Right wrist and Left wrist restraints initiated on patient on 3/2/2017 at 2000.    Clinical Justification: Pulling lines, pulling tubes, and pulling equipment  Less Restrictive Alternative: Re-evaluate equipment, Disguise equipment  Attending Physician Notified: Yes, Attending Physician's Name: Dr. Rodarte   Order received: Yes     Family Notification: yes  Criteria explained to other:niece  Patient's Response: No evidence of learning  Restraint care Plan initiated: Yes    Leonela Vitale

## 2017-03-04 VITALS
RESPIRATION RATE: 16 BRPM | OXYGEN SATURATION: 98 % | HEART RATE: 80 BPM | DIASTOLIC BLOOD PRESSURE: 70 MMHG | WEIGHT: 168.65 LBS | TEMPERATURE: 97.9 F | SYSTOLIC BLOOD PRESSURE: 108 MMHG

## 2017-03-04 LAB
ANION GAP SERPL CALCULATED.3IONS-SCNC: 7 MMOL/L (ref 3–14)
BACTERIA SPEC CULT: NORMAL
BUN SERPL-MCNC: 9 MG/DL (ref 7–30)
CALCIUM SERPL-MCNC: 8.2 MG/DL (ref 8.5–10.1)
CHLORIDE SERPL-SCNC: 114 MMOL/L (ref 94–109)
CO2 SERPL-SCNC: 25 MMOL/L (ref 20–32)
CREAT SERPL-MCNC: 1.08 MG/DL (ref 0.52–1.04)
ERYTHROCYTE [DISTWIDTH] IN BLOOD BY AUTOMATED COUNT: 14.4 % (ref 10–15)
GFR SERPL CREATININE-BSD FRML MDRD: 55 ML/MIN/1.7M2
GLUCOSE BLDC GLUCOMTR-MCNC: 104 MG/DL (ref 70–99)
GLUCOSE BLDC GLUCOMTR-MCNC: 115 MG/DL (ref 70–99)
GLUCOSE BLDC GLUCOMTR-MCNC: 130 MG/DL (ref 70–99)
GLUCOSE BLDC GLUCOMTR-MCNC: 71 MG/DL (ref 70–99)
GLUCOSE BLDC GLUCOMTR-MCNC: 88 MG/DL (ref 70–99)
GLUCOSE BLDC GLUCOMTR-MCNC: 97 MG/DL (ref 70–99)
GLUCOSE BLDC GLUCOMTR-MCNC: 98 MG/DL (ref 70–99)
GLUCOSE SERPL-MCNC: 110 MG/DL (ref 70–99)
HCT VFR BLD AUTO: 37.6 % (ref 35–47)
HGB BLD-MCNC: 12.3 G/DL (ref 11.7–15.7)
MCH RBC QN AUTO: 28.4 PG (ref 26.5–33)
MCHC RBC AUTO-ENTMCNC: 32.7 G/DL (ref 31.5–36.5)
MCV RBC AUTO: 87 FL (ref 78–100)
MICRO REPORT STATUS: NORMAL
PLATELET # BLD AUTO: 276 10E9/L (ref 150–450)
POTASSIUM SERPL-SCNC: 3.5 MMOL/L (ref 3.4–5.3)
RBC # BLD AUTO: 4.33 10E12/L (ref 3.8–5.2)
SODIUM SERPL-SCNC: 146 MMOL/L (ref 133–144)
SPECIMEN SOURCE: NORMAL
WBC # BLD AUTO: 10.2 10E9/L (ref 4–11)

## 2017-03-04 PROCEDURE — 25000125 ZZHC RX 250: Performed by: FAMILY MEDICINE

## 2017-03-04 PROCEDURE — 00000146 ZZHCL STATISTIC GLUCOSE BY METER IP

## 2017-03-04 PROCEDURE — 85027 COMPLETE CBC AUTOMATED: CPT | Performed by: FAMILY MEDICINE

## 2017-03-04 PROCEDURE — 25000132 ZZH RX MED GY IP 250 OP 250 PS 637: Performed by: FAMILY MEDICINE

## 2017-03-04 PROCEDURE — 25000128 H RX IP 250 OP 636: Performed by: FAMILY MEDICINE

## 2017-03-04 PROCEDURE — 99239 HOSP IP/OBS DSCHRG MGMT >30: CPT | Performed by: FAMILY MEDICINE

## 2017-03-04 PROCEDURE — 80048 BASIC METABOLIC PNL TOTAL CA: CPT | Performed by: FAMILY MEDICINE

## 2017-03-04 RX ORDER — CIPROFLOXACIN 250 MG/1
250 TABLET, FILM COATED ORAL EVERY 12 HOURS SCHEDULED
Status: DISCONTINUED | OUTPATIENT
Start: 2017-03-04 | End: 2017-03-04 | Stop reason: HOSPADM

## 2017-03-04 RX ORDER — CIPROFLOXACIN 250 MG/1
250 TABLET, FILM COATED ORAL 2 TIMES DAILY
Qty: 10 TABLET | Refills: 0 | Status: SHIPPED | OUTPATIENT
Start: 2017-03-05 | End: 2017-03-10

## 2017-03-04 RX ADMIN — CIPROFLOXACIN HYDROCHLORIDE 250 MG: 250 TABLET, FILM COATED ORAL at 17:14

## 2017-03-04 RX ADMIN — CIPROFLOXACIN 200 MG: 2 INJECTION INTRAVENOUS at 04:57

## 2017-03-04 RX ADMIN — DEXTROSE AND SODIUM CHLORIDE: 5; 900 INJECTION, SOLUTION INTRAVENOUS at 07:55

## 2017-03-04 NOTE — DISCHARGE SUMMARY
[facility] Discharge Summary    Ankita Bateman MRN# [id number]   Age: 46 year old YOB: 1970     Date of Admission:  3/2/2017  Date of Discharge::  3/4/2017  Admitting Physician:  Greg Rodarte MD  Discharge Physician:  Anna Garcia MD    [place] [facility]: [facility]          Admission Diagnoses:   Methamphetamine abuse [F15.10]  Opiate overdose, undetermined intent, initial encounter [T40.604A]  Bambi coma scale total score 3-8, at arrival to emergency department [R40.2432]          Discharge Diagnosis:   Active Problems:    Unresponsive    Assessment:  Patient presented with an unresponsive episode, for which she was intubated. It was thought this was secondary to heroin and methamphetamine use and patient was monitored closely, with progressive resolution of unresponsiveness. She was extubated without difficulty and continues to clinically improve without signs or symptoms concerning for infectious or other etiology.    Plan:  Patient has already started to pursue treatment program options for her heroin and methamphetamine addiction. She does not feel she needs any further assistance at this time.      Accidental drug overdose    Assessment:  Resulting in an unresponsive episode as above    Plan:  Discharge with findings above      Narcotic overdose    Assessment:  Patient used heroin approximately 2 hours prior to ED presentation    Plan:  Discharge with plan as above      Methamphetamine abuse    Assessment:  Patient last used the morning of presentation    Plan:  Discharge with plan as above      Acute cystitis without hematuria    Assessment:  Urine culture is growing out gram-negative rods    Plan:  Patient will be discharged home on ciprofloxacin. She will call tomorrow to ensure urine culture results show sensitivity effectiveness.      Type 2 diabetes mellitus without complication (H)    Assessment: Patient initially was hypoglycemic, however blood sugars have  stabilized prior to discharge    Plan:  Discharge without changed home regimen          Procedures:   Patient was intubated on 3/2/17 and extubated on 3/3/17 without complication          Medications Prior to Admission:     Prescriptions Prior to Admission   Medication Sig Dispense Refill Last Dose     buPROPion (WELLBUTRIN XL) 300 MG 24 hr tablet Take 300 mg by mouth        metFORMIN (GLUCOPHAGE) 1000 MG tablet Take 1,000 mg by mouth        oxyCODONE-acetaminophen (PERCOCET) 5-325 MG per tablet Take 1-2 tablets by mouth        gabapentin (NEURONTIN) 600 MG tablet Take 1,200 mg by mouth        lisinopril (PRINIVIL/ZESTRIL) 20 MG tablet Take 20 mg by mouth        zolpidem (AMBIEN) 5 MG tablet                 Discharge Medications:     Current Discharge Medication List      START taking these medications    Details   ciprofloxacin (CIPRO) 250 MG tablet Take 1 tablet (250 mg) by mouth 2 times daily for 5 days  Qty: 10 tablet, Refills: 0    Associated Diagnoses: Acute cystitis without hematuria         CONTINUE these medications which have NOT CHANGED    Details   buPROPion (WELLBUTRIN XL) 300 MG 24 hr tablet Take 300 mg by mouth      metFORMIN (GLUCOPHAGE) 1000 MG tablet Take 1,000 mg by mouth      oxyCODONE-acetaminophen (PERCOCET) 5-325 MG per tablet Take 1-2 tablets by mouth      gabapentin (NEURONTIN) 600 MG tablet Take 1,200 mg by mouth      lisinopril (PRINIVIL/ZESTRIL) 20 MG tablet Take 20 mg by mouth      zolpidem (AMBIEN) 5 MG tablet                    Consultations:   Consultation during this admission received from Dr. Dowling, tele-ICU provider prior to extubation          Brief History of Illness:   Patient is a 46-year-old female who presented to the emergency room unresponsive.  Patient was apparently driving with friends when she became unresponsive, and they dropped her off in the emergency room where she was found to have a decreased Glascow coma score and respiratory rate of 6-7. She was intubated and  stabilized and her urine tox screen was positive for opioids and methamphetamine. Family will was contacted and patient does have a history of heroin and methamphetamine abuse. On workup patient was also noted to have urinary tract infection and started on ciprofloxacin. Decision was made to admit the patient for ongoing medical management.          Hospital Course:    During the patient's hospitalization she did have progressive resolution of her unresponsiveness over the next 12 hours and she was able to successfully extubated without difficulty. She did have difficulty with hypoglycemia and needed dextrose-containing fluids, however following extubation and initiation of diet she was able to stabilize her blood sugars. She tolerated ciprofloxacin well and urine culture at time of discharge if showing gram-negative rods with patient afebrile and medically stable. She does freely admit to the heroin and methamphetamine abuse and states as of 2 weeks ago she had already started to pursue treatment options as she realizes she has a problem.  Patient is willing to follow-up in the clinic closely for reevaluation and further discussion of ongoing medical management and she is discharged in stable condition.         Physical Exam:   Vitals were reviewed  Constitutional:   awake, alert, cooperative, no apparent distress, and appears stated age     Lungs:   No increased work of breathing, good air exchange, clear to auscultation bilaterally, no crackles or wheezing     Cardiovascular:   Normal apical impulse, regular rate and rhythm, normal S1 and S2, no S3 or S4, and no murmur noted     Abdomen:   Bowel sounds present, abdomen soft and non-tender     Musculoskeletal:   no lower extremity pitting edema present     Neurologic:   Awake, alert, oriented to name, place and time.      Skin:   normal skin color, texture, turgor              Discharge Instructions and Follow-Up:   Discharge diet: Moderate consistent carbohydrate  (1060-2089 manny / 4-6 CHO units per meal)   Discharge activity: Activity as tolerated   Discharge follow-up: Follow up with primary care provider within 7 days           Discharge Disposition:   Discharged to home      Attestation:  I have reviewed today's vital signs, notes, medications, labs and imaging.    More than 30 minutes was spent on this discharge.      Anna Garcia MD    Note: Chart documentation done in part with Dragon Voice Recognition software. Although reviewed after completion, some word and grammatical errors may remain.

## 2017-03-04 NOTE — PLAN OF CARE
Problem: Goal Outcome Summary  Goal: Goal Outcome Summary  Outcome: Improving  Pt extubated yesterday morning.  Lungs have fine cxs in the bases with some insp wheezes.  Sats low 90's on 1 L via NC, attempting pt on room air this am.  Good UO in [place] cath d/c'd at 0500.  DTV [id number].  Tylenol x 1 for headache.  Blood sugars  overnight, pt asymptomatic and juice given x 2.

## 2017-03-05 LAB
BACTERIA SPEC CULT: ABNORMAL
MICRO REPORT STATUS: ABNORMAL
MICROORGANISM SPEC CULT: ABNORMAL
SPECIMEN SOURCE: ABNORMAL

## 2017-03-05 NOTE — PROGRESS NOTES
S-(situation): Note    B-(background): OD Meth/Heroine    A-(assessment): Pt is up and about in rm and feeling good.  Eating well and vdg after [place] was discont.  Plan is for pt to go home.  Her daughter will pick her up between [id number].  F/U appt made for pt and education done.  Discharge orders reviewed and pt verbalize understanding and signed.    R-(recommendations): Plan for discharge.

## 2017-03-05 NOTE — PROGRESS NOTES
.S-(situation): Patient discharged to home via wheelchair with daughter    B-(background): Overdose    A-(assessment): Pt discharged to home with daughter.  VSS, denies pain.  Tolerating eating and voiding.  No complaints at this time.      R-(recommendations): Discharge instructions reviewed with patient. Listed belongings gathered and returned to patient.          Discharge Nursing Criteria:     Care Plan and Patient education resolved: Yes    New Medications- pt has been educated about purpose and side effects: Not Applicable    Vaccines  Pneumonia Vaccine verified at discharge: Yes - pt refused vaccine  Influenza status verified at discharge:  Yes - pt refused vaccine      Intentionally Retained Items (examples: Drains, Wound packing, ureteral stents, chu, PICC line or IV)  Patient was sent home with no items left in place.   Information you need to know about your procedure form filled out and copy given to patient: No  Follow up appointment made for removal: No    MISC  Prescriptions if needed, hard copies sent with patient  Yes  Home and hospital aquired medications returned to patient: NA  Medication Bin checked and emptied on discharge Yes  Patient reports post-discharge pain management plan is effective: Yes

## 2017-03-09 NOTE — PROGRESS NOTES
"Ankita Bateman  Gender: female  : 1970 [address]  [place] [address]  [phone] (home)     Medical Record: [id number]  Pharmacy: Data Unavailable  Primary Care Provider: None    Parent's names are: Data Unavailable (mother) and Data Unavailable (father).      [facility]  2017  Discharge Phone Call:  Key Words/Key Times      Introduction - AIDET (Acknowledge, Introduce, Duration, Explanation)      Empathy-   We are calling to see how you are since your recent stay in the hospital?     Call back COMMENTS:       Clinical Questions -  (f/u appts, medication side effects/purpose, ability to care for self at home) \"For your safety, it is important to us that you understand the purpose and side effects of your medications, can you tell me what your new medications are?\"     Call back COMMENTS:       Staff Recognition -  We like to recognize staff and physicians who have done an excellent job.  Do you remember any people from your care team that you would like recognize?     Call back COMMENTS:       Very Good Care -  We want to provide very good care to all patients.  How was your care?     Call back COMMENTS:       Opportunities for Improvement -  Our goal is to be the best.  Do you have any suggestions for things that we could improve upon?     Call back COMMENTS:       Thank You        Phone number is no longer in service   Discharge callback 3/6/17 Sarah SULLIVAN  "

## 2020-01-01 ENCOUNTER — OFFICE VISIT (OUTPATIENT)
Dept: URGENT CARE | Facility: URGENT CARE | Age: 50
End: 2020-01-01
Payer: COMMERCIAL

## 2020-01-01 VITALS
OXYGEN SATURATION: 99 % | HEIGHT: 68 IN | DIASTOLIC BLOOD PRESSURE: 70 MMHG | BODY MASS INDEX: 31.67 KG/M2 | WEIGHT: 209 LBS | RESPIRATION RATE: 16 BRPM | TEMPERATURE: 98.4 F | SYSTOLIC BLOOD PRESSURE: 112 MMHG | HEART RATE: 76 BPM

## 2020-01-01 DIAGNOSIS — E11.9 TYPE 2 DIABETES MELLITUS WITHOUT COMPLICATION, WITHOUT LONG-TERM CURRENT USE OF INSULIN (H): ICD-10-CM

## 2020-01-01 DIAGNOSIS — J02.0 STREPTOCOCCAL PHARYNGITIS: Primary | ICD-10-CM

## 2020-01-01 DIAGNOSIS — R07.0 THROAT PAIN: ICD-10-CM

## 2020-01-01 LAB
DEPRECATED S PYO AG THROAT QL EIA: ABNORMAL
SPECIMEN SOURCE: ABNORMAL

## 2020-01-01 PROCEDURE — 87880 STREP A ASSAY W/OPTIC: CPT | Performed by: FAMILY MEDICINE

## 2020-01-01 PROCEDURE — 99204 OFFICE O/P NEW MOD 45 MIN: CPT | Performed by: PHYSICIAN ASSISTANT

## 2020-01-01 RX ORDER — AMOXICILLIN 500 MG/1
500 CAPSULE ORAL 2 TIMES DAILY
Qty: 20 CAPSULE | Refills: 0 | Status: SHIPPED | OUTPATIENT
Start: 2020-01-01 | End: 2020-01-11

## 2020-01-01 ASSESSMENT — ENCOUNTER SYMPTOMS
ABDOMINAL PAIN: 0
NAUSEA: 0
HEADACHES: 1
SORE THROAT: 1
CHILLS: 0
FOCAL WEAKNESS: 0
VOMITING: 0
SHORTNESS OF BREATH: 0
FEVER: 1
DIARRHEA: 0

## 2020-01-01 ASSESSMENT — MIFFLIN-ST. JEOR: SCORE: 1622.55

## 2020-01-01 NOTE — PROGRESS NOTES
HPI  2020    HPI: Ankita Bateman is a 49 year old female with hx DM who complains of moderate sore throat, HA, & subjective fever onset 3 days ago. Unknown Tmax. Symptoms are constant in duration. No treatments tried. Denies cough, congestion, CP, SOB, abd pain, N/V/D, rash, or any other symptoms. Patient denies sick contacts.    Past Medical History:   Diagnosis Date     Diabetes (H)      Hypertension      Substance abuse (H)      Past Surgical History:   Procedure Laterality Date     BREAST SURGERY        SECTION       Social History     Tobacco Use     Smoking status: Smoker, Current Status Unknown   Substance Use Topics     Alcohol use: None     Drug use: Yes     Types: Methamphetamines     Patient Active Problem List   Diagnosis     Accidental drug overdose     Narcotic overdose (H)     Methamphetamine abuse (H)     Unresponsive     Acute cystitis without hematuria     Type 2 diabetes mellitus without complication (H)     Family History   Family history unknown: Yes        Problem list, Medication list, Allergies, and Medical/Social/Surgical histories reviewed in [facility] and updated as appropriate.      Review of Systems   Constitutional: Positive for fever. Negative for chills.   HENT: Positive for sore throat. Negative for congestion.    Respiratory: Negative for shortness of breath.    Cardiovascular: Negative for chest pain.   Gastrointestinal: Negative for abdominal pain, diarrhea, nausea and vomiting.   Skin: Negative for rash.   Neurological: Positive for headaches. Negative for focal weakness.   All other systems reviewed and are negative.        Physical Exam  Vitals signs and nursing note reviewed.   HENT:      Head: Normocephalic and atraumatic.      Right Ear: Tympanic membrane and external ear normal.      Left Ear: Tympanic membrane and external ear normal.      Nose: Nose normal.      Mouth/Throat:      Mouth: Mucous membranes are moist.      Pharynx: Posterior oropharyngeal erythema  "present.      Tonsils: Tonsillar exudate present. No tonsillar abscesses. Swellin+ on the right. 1+ on the left.   Eyes:      General: Lids are normal.   Cardiovascular:      Rate and Rhythm: Normal rate and regular rhythm.      Heart sounds: Normal heart sounds.   Pulmonary:      Effort: Pulmonary effort is normal.      Breath sounds: Normal breath sounds.   Musculoskeletal: Normal range of motion.   Skin:     General: Skin is warm and dry.   Neurological:      Mental Status: She is alert and oriented to person, place, and time.   Psychiatric:         Behavior: Behavior normal.       Vital Signs  /70   Pulse 76   Temp 98.4  F (36.9  C)   Resp 16   Ht 1.721 m (5' 7.75\")   Wt 94.8 kg (209 lb)   SpO2 99%   BMI 32.01 kg/m       Diagnostic Test Results:  none     ASSESSMENT/PLAN      ICD-10-CM    1. Streptococcal pharyngitis J02.0 amoxicillin (AMOXIL) 500 MG capsule   2. Throat pain R07.0 Strep, Rapid Screen   3. Type 2 diabetes mellitus without complication, without long-term current use of insulin (H) E11.9       Strep positive, no s/sx PTA. Rx amoxicillin.   Continue current management for DM.      I have discussed any lab or imaging results, the patient's diagnosis, and my plan of treatment with the patient and/or family. Patient is aware to come back in if with worsening symptoms or if no relief despite treatment plan.  Patient voiced understanding and had no further questions.       Follow Up: Return in about 1 week (around 2020) for Follow up w/ primary care provider if not better.    ARCHANA Lay, PAChristineC  [facility]          "

## 2020-01-24 ENCOUNTER — MEDICAL CORRESPONDENCE (OUTPATIENT)
Dept: HEALTH INFORMATION MANAGEMENT | Facility: CLINIC | Age: 50
End: 2020-01-24

## 2020-01-27 ENCOUNTER — TRANSCRIBE ORDERS (OUTPATIENT)
Dept: OTHER | Age: 50
End: 2020-01-27

## 2020-01-27 DIAGNOSIS — E11.9 CONTROLLED TYPE 2 DIABETES MELLITUS WITHOUT COMPLICATION, UNSPECIFIED WHETHER LONG TERM INSULIN USE (H): Primary | ICD-10-CM

## 2022-05-26 ENCOUNTER — LAB REQUISITION (OUTPATIENT)
Dept: LAB | Facility: CLINIC | Age: 52
End: 2022-05-26
Payer: COMMERCIAL

## 2022-05-26 DIAGNOSIS — Z00.00 ENCOUNTER FOR GENERAL ADULT MEDICAL EXAMINATION WITHOUT ABNORMAL FINDINGS: ICD-10-CM

## 2022-05-26 LAB
ALBUMIN SERPL-MCNC: 3.5 G/DL (ref 3.4–5)
ALP SERPL-CCNC: 96 U/L (ref 40–150)
ALT SERPL W P-5'-P-CCNC: 22 U/L (ref 0–50)
ANION GAP SERPL CALCULATED.3IONS-SCNC: 8 MMOL/L (ref 3–14)
AST SERPL W P-5'-P-CCNC: 19 U/L (ref 0–45)
BILIRUB SERPL-MCNC: 0.6 MG/DL (ref 0.2–1.3)
BUN SERPL-MCNC: 15 MG/DL (ref 7–30)
CALCIUM SERPL-MCNC: 9.8 MG/DL (ref 8.5–10.1)
CHLORIDE BLD-SCNC: 110 MMOL/L (ref 94–109)
CHOLEST SERPL-MCNC: 198 MG/DL
CO2 SERPL-SCNC: 22 MMOL/L (ref 20–32)
CREAT SERPL-MCNC: 0.91 MG/DL (ref 0.52–1.04)
FASTING STATUS PATIENT QL REPORTED: NO
GFR SERPL CREATININE-BSD FRML MDRD: 76 ML/MIN/1.73M2
GLUCOSE BLD-MCNC: 153 MG/DL (ref 70–99)
HDLC SERPL-MCNC: 46 MG/DL
LDLC SERPL CALC-MCNC: 124 MG/DL
NONHDLC SERPL-MCNC: 152 MG/DL
POTASSIUM BLD-SCNC: 4.1 MMOL/L (ref 3.4–5.3)
PROT SERPL-MCNC: 8.4 G/DL (ref 6.8–8.8)
SODIUM SERPL-SCNC: 140 MMOL/L (ref 133–144)
TRIGL SERPL-MCNC: 141 MG/DL

## 2022-05-26 PROCEDURE — 84478 ASSAY OF TRIGLYCERIDES: CPT | Mod: ORL | Performed by: INTERNAL MEDICINE

## 2022-05-26 PROCEDURE — 86803 HEPATITIS C AB TEST: CPT | Mod: ORL | Performed by: INTERNAL MEDICINE

## 2022-05-26 PROCEDURE — 87389 HIV-1 AG W/HIV-1&-2 AB AG IA: CPT | Mod: ORL | Performed by: INTERNAL MEDICINE

## 2022-05-26 PROCEDURE — 80053 COMPREHEN METABOLIC PANEL: CPT | Performed by: INTERNAL MEDICINE

## 2022-05-27 LAB
HCV AB SERPL QL IA: NONREACTIVE
HIV 1+2 AB+HIV1 P24 AG SERPL QL IA: NONREACTIVE

## 2022-09-23 ENCOUNTER — LAB REQUISITION (OUTPATIENT)
Dept: LAB | Facility: CLINIC | Age: 52
End: 2022-09-23
Payer: COMMERCIAL

## 2022-09-23 DIAGNOSIS — F11.90 OPIOID USE, UNSPECIFIED, UNCOMPLICATED: ICD-10-CM

## 2022-09-23 DIAGNOSIS — N39.0 URINARY TRACT INFECTION, SITE NOT SPECIFIED: ICD-10-CM

## 2022-09-23 PROCEDURE — 87186 SC STD MICRODIL/AGAR DIL: CPT | Mod: ORL | Performed by: INTERNAL MEDICINE

## 2022-09-25 LAB
BACTERIA UR CULT: ABNORMAL
BACTERIA UR CULT: ABNORMAL

## 2022-09-28 ENCOUNTER — TRANSCRIBE ORDERS (OUTPATIENT)
Dept: OTHER | Age: 52
End: 2022-09-28

## 2022-09-28 DIAGNOSIS — E11.9 CONTROLLED TYPE 2 DIABETES MELLITUS WITHOUT COMPLICATION, UNSPECIFIED WHETHER LONG TERM INSULIN USE (H): Primary | ICD-10-CM

## 2022-12-06 ENCOUNTER — DOCUMENTATION ONLY (OUTPATIENT)
Dept: CARDIOLOGY | Facility: CLINIC | Age: 52
End: 2022-12-06

## 2022-12-06 NOTE — PROGRESS NOTES
Received fax from [facility] regarding recent ED stay for influenza like illness. Patient was instructed to follow up with Dr. Turpin following discharge from ED. No appt yet scheduled. Will send fax to [facility] to get information scanned into chart.  ALECIA SULLIVAN

## 2023-01-10 NOTE — PROGRESS NOTES
Received documents from [facility] on this pt, but after reviewing the chart pt PCP is Dr Turpin, but is Dr Nikolai Turpin, not Dr Endy Turpin.  Fax to be shredded. Jose David

## 2023-03-13 ENCOUNTER — TRANSCRIBE ORDERS (OUTPATIENT)
Dept: FAMILY MEDICINE | Facility: OTHER | Age: 53
End: 2023-03-13

## 2023-03-13 DIAGNOSIS — Z00.00 ROUTINE HISTORY AND PHYSICAL EXAMINATION OF ADULT: Primary | ICD-10-CM

## 2024-01-19 ENCOUNTER — LAB REQUISITION (OUTPATIENT)
Dept: LAB | Facility: CLINIC | Age: 54
End: 2024-01-19
Payer: COMMERCIAL

## 2024-01-19 DIAGNOSIS — F11.90 OPIOID USE, UNSPECIFIED, UNCOMPLICATED: ICD-10-CM

## 2024-01-19 PROCEDURE — 80053 COMPREHEN METABOLIC PANEL: CPT | Mod: ORL | Performed by: INTERNAL MEDICINE

## 2024-01-19 PROCEDURE — 80061 LIPID PANEL: CPT | Mod: ORL | Performed by: INTERNAL MEDICINE

## 2024-01-20 LAB
ALBUMIN SERPL BCG-MCNC: 4.1 G/DL (ref 3.5–5.2)
ALP SERPL-CCNC: 118 U/L (ref 40–150)
ALT SERPL W P-5'-P-CCNC: 24 U/L (ref 0–50)
ANION GAP SERPL CALCULATED.3IONS-SCNC: 16 MMOL/L (ref 7–15)
AST SERPL W P-5'-P-CCNC: 21 U/L (ref 0–45)
BILIRUB SERPL-MCNC: 0.7 MG/DL
BUN SERPL-MCNC: 16.8 MG/DL (ref 6–20)
CALCIUM SERPL-MCNC: 9.5 MG/DL (ref 8.6–10)
CHLORIDE SERPL-SCNC: 99 MMOL/L (ref 98–107)
CHOLEST SERPL-MCNC: 147 MG/DL
CREAT SERPL-MCNC: 1.35 MG/DL (ref 0.51–0.95)
DEPRECATED HCO3 PLAS-SCNC: 20 MMOL/L (ref 22–29)
EGFRCR SERPLBLD CKD-EPI 2021: 47 ML/MIN/1.73M2
FASTING STATUS PATIENT QL REPORTED: NO
GLUCOSE SERPL-MCNC: 344 MG/DL (ref 70–99)
HDLC SERPL-MCNC: 50 MG/DL
LDLC SERPL CALC-MCNC: 70 MG/DL
NONHDLC SERPL-MCNC: 97 MG/DL
POTASSIUM SERPL-SCNC: 4.4 MMOL/L (ref 3.4–5.3)
PROT SERPL-MCNC: 8 G/DL (ref 6.4–8.3)
SODIUM SERPL-SCNC: 135 MMOL/L (ref 135–145)
TRIGL SERPL-MCNC: 137 MG/DL

## 2024-01-22 ENCOUNTER — TRANSCRIBE ORDERS (OUTPATIENT)
Dept: OTHER | Age: 54
End: 2024-01-22

## 2024-01-22 DIAGNOSIS — Z12.11 COLON CANCER SCREENING: Primary | ICD-10-CM

## 2025-01-17 ENCOUNTER — LAB REQUISITION (OUTPATIENT)
Dept: LAB | Facility: CLINIC | Age: 55
End: 2025-01-17
Payer: COMMERCIAL

## 2025-01-17 DIAGNOSIS — E11.9 TYPE 2 DIABETES MELLITUS WITHOUT COMPLICATIONS (H): ICD-10-CM

## 2025-01-17 PROCEDURE — 80061 LIPID PANEL: CPT | Mod: ORL | Performed by: INTERNAL MEDICINE

## 2025-01-17 PROCEDURE — 80053 COMPREHEN METABOLIC PANEL: CPT | Mod: ORL | Performed by: INTERNAL MEDICINE

## 2025-01-18 LAB
ALBUMIN SERPL BCG-MCNC: 4 G/DL (ref 3.5–5.2)
ALP SERPL-CCNC: 86 U/L (ref 40–150)
ALT SERPL W P-5'-P-CCNC: 15 U/L (ref 0–50)
ANION GAP SERPL CALCULATED.3IONS-SCNC: 11 MMOL/L (ref 7–15)
AST SERPL W P-5'-P-CCNC: 18 U/L (ref 0–45)
BILIRUB SERPL-MCNC: 0.4 MG/DL
BUN SERPL-MCNC: 16.6 MG/DL (ref 6–20)
CALCIUM SERPL-MCNC: 9.1 MG/DL (ref 8.8–10.4)
CHLORIDE SERPL-SCNC: 108 MMOL/L (ref 98–107)
CHOLEST SERPL-MCNC: 111 MG/DL
CREAT SERPL-MCNC: 1.34 MG/DL (ref 0.51–0.95)
EGFRCR SERPLBLD CKD-EPI 2021: 47 ML/MIN/1.73M2
FASTING STATUS PATIENT QL REPORTED: NO
FASTING STATUS PATIENT QL REPORTED: NO
GLUCOSE SERPL-MCNC: 99 MG/DL (ref 70–99)
HCO3 SERPL-SCNC: 22 MMOL/L (ref 22–29)
HDLC SERPL-MCNC: 45 MG/DL
LDLC SERPL CALC-MCNC: 46 MG/DL
NONHDLC SERPL-MCNC: 66 MG/DL
POTASSIUM SERPL-SCNC: 4.3 MMOL/L (ref 3.4–5.3)
PROT SERPL-MCNC: 7.5 G/DL (ref 6.4–8.3)
SODIUM SERPL-SCNC: 141 MMOL/L (ref 135–145)
TRIGL SERPL-MCNC: 101 MG/DL

## 2025-05-19 ENCOUNTER — LAB REQUISITION (OUTPATIENT)
Dept: LAB | Facility: CLINIC | Age: 55
End: 2025-05-19
Payer: COMMERCIAL

## 2025-05-19 DIAGNOSIS — F11.90 OPIOID USE, UNSPECIFIED, UNCOMPLICATED: ICD-10-CM

## 2025-05-19 LAB
CREAT UR-MCNC: 54.7 MG/DL
MICROALBUMIN UR-MCNC: <12 MG/L
MICROALBUMIN/CREAT UR: NORMAL MG/G{CREAT}

## 2025-05-19 PROCEDURE — 82570 ASSAY OF URINE CREATININE: CPT | Mod: ORL | Performed by: INTERNAL MEDICINE
